# Patient Record
Sex: MALE | Race: WHITE | Employment: FULL TIME | URBAN - NONMETROPOLITAN AREA
[De-identification: names, ages, dates, MRNs, and addresses within clinical notes are randomized per-mention and may not be internally consistent; named-entity substitution may affect disease eponyms.]

---

## 2019-09-09 ENCOUNTER — TELEPHONE (OUTPATIENT)
Dept: FAMILY MEDICINE CLINIC | Age: 49
End: 2019-09-09

## 2019-11-04 ENCOUNTER — OFFICE VISIT (OUTPATIENT)
Dept: FAMILY MEDICINE CLINIC | Age: 49
End: 2019-11-04
Payer: OTHER GOVERNMENT

## 2019-11-04 ENCOUNTER — TELEPHONE (OUTPATIENT)
Dept: FAMILY MEDICINE CLINIC | Age: 49
End: 2019-11-04

## 2019-11-04 VITALS
BODY MASS INDEX: 25.33 KG/M2 | HEIGHT: 69 IN | WEIGHT: 171 LBS | SYSTOLIC BLOOD PRESSURE: 110 MMHG | HEART RATE: 86 BPM | OXYGEN SATURATION: 99 % | DIASTOLIC BLOOD PRESSURE: 68 MMHG | RESPIRATION RATE: 16 BRPM | TEMPERATURE: 97.6 F

## 2019-11-04 DIAGNOSIS — F80.81 STUTTERING: ICD-10-CM

## 2019-11-04 DIAGNOSIS — H53.9 VISUAL DISTURBANCE: ICD-10-CM

## 2019-11-04 DIAGNOSIS — F51.5 NIGHTMARES: ICD-10-CM

## 2019-11-04 DIAGNOSIS — M54.31 BILATERAL SCIATICA: ICD-10-CM

## 2019-11-04 DIAGNOSIS — G89.29 CHRONIC PAIN OF BOTH SHOULDERS: ICD-10-CM

## 2019-11-04 DIAGNOSIS — Z12.5 PROSTATE CANCER SCREENING: ICD-10-CM

## 2019-11-04 DIAGNOSIS — M25.562 CHRONIC PAIN OF BOTH KNEES: ICD-10-CM

## 2019-11-04 DIAGNOSIS — Z23 NEED FOR INFLUENZA VACCINATION: Primary | ICD-10-CM

## 2019-11-04 DIAGNOSIS — Z00.00 WELL ADULT EXAM: ICD-10-CM

## 2019-11-04 DIAGNOSIS — Z12.11 ENCOUNTER FOR SCREENING COLONOSCOPY: ICD-10-CM

## 2019-11-04 DIAGNOSIS — G47.33 OSA (OBSTRUCTIVE SLEEP APNEA): ICD-10-CM

## 2019-11-04 DIAGNOSIS — M54.32 BILATERAL SCIATICA: ICD-10-CM

## 2019-11-04 DIAGNOSIS — M25.512 CHRONIC PAIN OF BOTH SHOULDERS: ICD-10-CM

## 2019-11-04 DIAGNOSIS — M25.511 CHRONIC PAIN OF BOTH SHOULDERS: ICD-10-CM

## 2019-11-04 DIAGNOSIS — E78.2 MIXED HYPERLIPIDEMIA: ICD-10-CM

## 2019-11-04 DIAGNOSIS — F41.9 ANXIETY: ICD-10-CM

## 2019-11-04 DIAGNOSIS — F43.10 PTSD (POST-TRAUMATIC STRESS DISORDER): ICD-10-CM

## 2019-11-04 DIAGNOSIS — G89.29 CHRONIC PAIN OF BOTH KNEES: ICD-10-CM

## 2019-11-04 DIAGNOSIS — Z12.83 SKIN CANCER SCREENING: ICD-10-CM

## 2019-11-04 DIAGNOSIS — M25.561 CHRONIC PAIN OF BOTH KNEES: ICD-10-CM

## 2019-11-04 PROBLEM — E78.5 HYPERLIPIDEMIA: Status: ACTIVE | Noted: 2019-11-04

## 2019-11-04 PROBLEM — M54.30 SCIATICA: Status: ACTIVE | Noted: 2019-11-04

## 2019-11-04 PROCEDURE — 99203 OFFICE O/P NEW LOW 30 MIN: CPT | Performed by: NURSE PRACTITIONER

## 2019-11-04 PROCEDURE — 99386 PREV VISIT NEW AGE 40-64: CPT | Performed by: NURSE PRACTITIONER

## 2019-11-04 PROCEDURE — 90686 IIV4 VACC NO PRSV 0.5 ML IM: CPT | Performed by: NURSE PRACTITIONER

## 2019-11-04 PROCEDURE — 90471 IMMUNIZATION ADMIN: CPT | Performed by: NURSE PRACTITIONER

## 2019-11-04 RX ORDER — FLUTICASONE PROPIONATE 50 MCG
2 SPRAY, SUSPENSION (ML) NASAL DAILY
Qty: 1 BOTTLE | Refills: 5 | Status: SHIPPED | OUTPATIENT
Start: 2019-11-04 | End: 2020-04-27

## 2019-11-04 ASSESSMENT — ENCOUNTER SYMPTOMS
CHEST TIGHTNESS: 0
NAUSEA: 0
BLOOD IN STOOL: 0
WHEEZING: 0
SORE THROAT: 0
ABDOMINAL PAIN: 0
SHORTNESS OF BREATH: 0
COUGH: 0
BACK PAIN: 1
DIARRHEA: 0
VOMITING: 0

## 2019-11-04 ASSESSMENT — PATIENT HEALTH QUESTIONNAIRE - PHQ9
SUM OF ALL RESPONSES TO PHQ QUESTIONS 1-9: 0
SUM OF ALL RESPONSES TO PHQ9 QUESTIONS 1 & 2: 0
1. LITTLE INTEREST OR PLEASURE IN DOING THINGS: 0
SUM OF ALL RESPONSES TO PHQ QUESTIONS 1-9: 0
2. FEELING DOWN, DEPRESSED OR HOPELESS: 0

## 2019-11-05 ENCOUNTER — HOSPITAL ENCOUNTER (OUTPATIENT)
Dept: GENERAL RADIOLOGY | Age: 49
Discharge: HOME | End: 2019-11-05
Payer: OTHER GOVERNMENT

## 2019-11-05 ENCOUNTER — TELEPHONE (OUTPATIENT)
Dept: FAMILY MEDICINE CLINIC | Age: 49
End: 2019-11-05

## 2019-11-05 DIAGNOSIS — D72.819 LEUKOPENIA, UNSPECIFIED TYPE: Primary | ICD-10-CM

## 2019-11-05 DIAGNOSIS — M25.562 CHRONIC PAIN OF BOTH KNEES: Primary | ICD-10-CM

## 2019-11-05 DIAGNOSIS — G89.29 CHRONIC PAIN OF BOTH KNEES: Primary | ICD-10-CM

## 2019-11-05 DIAGNOSIS — Z12.5 PROSTATE CANCER SCREENING: ICD-10-CM

## 2019-11-05 DIAGNOSIS — M25.562 CHRONIC PAIN OF BOTH KNEES: ICD-10-CM

## 2019-11-05 DIAGNOSIS — M25.561 CHRONIC PAIN OF BOTH KNEES: Primary | ICD-10-CM

## 2019-11-05 DIAGNOSIS — Z00.00 WELL ADULT EXAM: ICD-10-CM

## 2019-11-05 DIAGNOSIS — M25.512 CHRONIC PAIN OF BOTH SHOULDERS: ICD-10-CM

## 2019-11-05 DIAGNOSIS — M25.561 CHRONIC PAIN OF BOTH KNEES: ICD-10-CM

## 2019-11-05 DIAGNOSIS — M25.511 CHRONIC PAIN OF BOTH SHOULDERS: ICD-10-CM

## 2019-11-05 DIAGNOSIS — E78.2 MIXED HYPERLIPIDEMIA: ICD-10-CM

## 2019-11-05 DIAGNOSIS — G89.29 CHRONIC PAIN OF BOTH SHOULDERS: ICD-10-CM

## 2019-11-05 DIAGNOSIS — G89.29 CHRONIC PAIN OF BOTH KNEES: ICD-10-CM

## 2019-11-05 LAB
ALBUMIN SERPL-MCNC: 4.6 G/DL (ref 3.5–5.2)
ALP BLD-CCNC: 56 U/L (ref 40–130)
ALT SERPL-CCNC: 14 U/L (ref 5–41)
ANION GAP SERPL CALCULATED.3IONS-SCNC: 14 MMOL/L (ref 7–19)
AST SERPL-CCNC: 14 U/L (ref 5–40)
BASOPHILS ABSOLUTE: 0 K/UL (ref 0–0.2)
BASOPHILS RELATIVE PERCENT: 0.5 % (ref 0–1)
BILIRUB SERPL-MCNC: 0.4 MG/DL (ref 0.2–1.2)
BILIRUBIN URINE: NEGATIVE
BLOOD, URINE: NEGATIVE
BUN BLDV-MCNC: 14 MG/DL (ref 6–20)
CALCIUM SERPL-MCNC: 9.6 MG/DL (ref 8.6–10)
CHLORIDE BLD-SCNC: 103 MMOL/L (ref 98–111)
CHOLESTEROL, TOTAL: 208 MG/DL (ref 160–199)
CLARITY: CLEAR
CO2: 24 MMOL/L (ref 22–29)
COLOR: YELLOW
CREAT SERPL-MCNC: 0.9 MG/DL (ref 0.5–1.2)
EOSINOPHILS ABSOLUTE: 0.1 K/UL (ref 0–0.6)
EOSINOPHILS RELATIVE PERCENT: 2.3 % (ref 0–5)
GFR NON-AFRICAN AMERICAN: >60
GLUCOSE BLD-MCNC: 94 MG/DL (ref 74–109)
GLUCOSE URINE: NEGATIVE MG/DL
HCT VFR BLD CALC: 47.6 % (ref 42–52)
HDLC SERPL-MCNC: 47 MG/DL (ref 55–121)
HEMOGLOBIN: 15.5 G/DL (ref 14–18)
IMMATURE GRANULOCYTES #: 0 K/UL
KETONES, URINE: NEGATIVE MG/DL
LDL CHOLESTEROL CALCULATED: 143 MG/DL
LEUKOCYTE ESTERASE, URINE: NEGATIVE
LYMPHOCYTES ABSOLUTE: 1.2 K/UL (ref 1.1–4.5)
LYMPHOCYTES RELATIVE PERCENT: 30.8 % (ref 20–40)
MCH RBC QN AUTO: 29.7 PG (ref 27–31)
MCHC RBC AUTO-ENTMCNC: 32.6 G/DL (ref 33–37)
MCV RBC AUTO: 91.2 FL (ref 80–94)
MONOCYTES ABSOLUTE: 0.6 K/UL (ref 0–0.9)
MONOCYTES RELATIVE PERCENT: 15.1 % (ref 0–10)
NEUTROPHILS ABSOLUTE: 2 K/UL (ref 1.5–7.5)
NEUTROPHILS RELATIVE PERCENT: 51 % (ref 50–65)
NITRITE, URINE: NEGATIVE
PDW BLD-RTO: 12.6 % (ref 11.5–14.5)
PH UA: 7.5 (ref 5–8)
PLATELET # BLD: 230 K/UL (ref 130–400)
PMV BLD AUTO: 10.4 FL (ref 9.4–12.4)
POTASSIUM SERPL-SCNC: 4.8 MMOL/L (ref 3.5–5)
PROSTATE SPECIFIC ANTIGEN: 0.44 NG/ML (ref 0–4)
PROTEIN UA: NEGATIVE MG/DL
RBC # BLD: 5.22 M/UL (ref 4.7–6.1)
SODIUM BLD-SCNC: 141 MMOL/L (ref 136–145)
SPECIFIC GRAVITY UA: 1.01 (ref 1–1.03)
T4 FREE: 1.2 NG/DL (ref 0.9–1.7)
TOTAL PROTEIN: 7.3 G/DL (ref 6.6–8.7)
TRIGL SERPL-MCNC: 89 MG/DL (ref 0–149)
TSH SERPL DL<=0.05 MIU/L-ACNC: 3.54 UIU/ML (ref 0.27–4.2)
UROBILINOGEN, URINE: 0.2 E.U./DL
WBC # BLD: 3.9 K/UL (ref 4.8–10.8)

## 2019-11-05 PROCEDURE — 73030 X-RAY EXAM OF SHOULDER: CPT

## 2019-11-05 PROCEDURE — 73562 X-RAY EXAM OF KNEE 3: CPT

## 2019-11-20 ENCOUNTER — TELEPHONE (OUTPATIENT)
Dept: GASTROENTEROLOGY | Age: 49
End: 2019-11-20

## 2019-11-21 ENCOUNTER — TELEPHONE (OUTPATIENT)
Dept: FAMILY MEDICINE CLINIC | Age: 49
End: 2019-11-21

## 2019-11-21 DIAGNOSIS — M25.511 CHRONIC PAIN OF BOTH SHOULDERS: ICD-10-CM

## 2019-11-21 DIAGNOSIS — M25.561 CHRONIC PAIN OF BOTH KNEES: Primary | ICD-10-CM

## 2019-11-21 DIAGNOSIS — G89.29 CHRONIC PAIN OF BOTH KNEES: Primary | ICD-10-CM

## 2019-11-21 DIAGNOSIS — M25.562 CHRONIC PAIN OF BOTH KNEES: Primary | ICD-10-CM

## 2019-11-21 DIAGNOSIS — G89.29 CHRONIC PAIN OF BOTH SHOULDERS: ICD-10-CM

## 2019-11-21 DIAGNOSIS — M25.512 CHRONIC PAIN OF BOTH SHOULDERS: ICD-10-CM

## 2019-11-27 ENCOUNTER — TELEPHONE (OUTPATIENT)
Dept: FAMILY MEDICINE CLINIC | Age: 49
End: 2019-11-27

## 2019-12-09 ENCOUNTER — OFFICE VISIT (OUTPATIENT)
Dept: PSYCHOLOGY | Age: 49
End: 2019-12-09
Payer: OTHER GOVERNMENT

## 2019-12-09 DIAGNOSIS — F33.1 MODERATE EPISODE OF RECURRENT MAJOR DEPRESSIVE DISORDER (HCC): ICD-10-CM

## 2019-12-09 DIAGNOSIS — F41.1 GAD (GENERALIZED ANXIETY DISORDER): Primary | ICD-10-CM

## 2019-12-09 PROCEDURE — 90791 PSYCH DIAGNOSTIC EVALUATION: CPT | Performed by: SOCIAL WORKER

## 2019-12-09 ASSESSMENT — PATIENT HEALTH QUESTIONNAIRE - PHQ9
3. TROUBLE FALLING OR STAYING ASLEEP: 1
6. FEELING BAD ABOUT YOURSELF - OR THAT YOU ARE A FAILURE OR HAVE LET YOURSELF OR YOUR FAMILY DOWN: 1
9. THOUGHTS THAT YOU WOULD BE BETTER OFF DEAD, OR OF HURTING YOURSELF: 0
4. FEELING TIRED OR HAVING LITTLE ENERGY: 1
2. FEELING DOWN, DEPRESSED OR HOPELESS: 1
10. IF YOU CHECKED OFF ANY PROBLEMS, HOW DIFFICULT HAVE THESE PROBLEMS MADE IT FOR YOU TO DO YOUR WORK, TAKE CARE OF THINGS AT HOME, OR GET ALONG WITH OTHER PEOPLE: 0
5. POOR APPETITE OR OVEREATING: 0
SUM OF ALL RESPONSES TO PHQ QUESTIONS 1-9: 9
7. TROUBLE CONCENTRATING ON THINGS, SUCH AS READING THE NEWSPAPER OR WATCHING TELEVISION: 2
SUM OF ALL RESPONSES TO PHQ9 QUESTIONS 1 & 2: 2
8. MOVING OR SPEAKING SO SLOWLY THAT OTHER PEOPLE COULD HAVE NOTICED. OR THE OPPOSITE, BEING SO FIGETY OR RESTLESS THAT YOU HAVE BEEN MOVING AROUND A LOT MORE THAN USUAL: 2
SUM OF ALL RESPONSES TO PHQ QUESTIONS 1-9: 9
1. LITTLE INTEREST OR PLEASURE IN DOING THINGS: 1

## 2019-12-09 ASSESSMENT — ANXIETY QUESTIONNAIRES
7. FEELING AFRAID AS IF SOMETHING AWFUL MIGHT HAPPEN: 1-SEVERAL DAYS
GAD7 TOTAL SCORE: 16
5. BEING SO RESTLESS THAT IT IS HARD TO SIT STILL: 3-NEARLY EVERY DAY
1. FEELING NERVOUS, ANXIOUS, OR ON EDGE: 3-NEARLY EVERY DAY
2. NOT BEING ABLE TO STOP OR CONTROL WORRYING: 3-NEARLY EVERY DAY
6. BECOMING EASILY ANNOYED OR IRRITABLE: 1-SEVERAL DAYS
4. TROUBLE RELAXING: 2-OVER HALF THE DAYS
3. WORRYING TOO MUCH ABOUT DIFFERENT THINGS: 3-NEARLY EVERY DAY

## 2019-12-10 ENCOUNTER — APPOINTMENT (OUTPATIENT)
Dept: OPERATING ROOM | Age: 49
End: 2019-12-10

## 2019-12-10 ENCOUNTER — TELEPHONE (OUTPATIENT)
Dept: FAMILY MEDICINE CLINIC | Age: 49
End: 2019-12-10

## 2019-12-10 ENCOUNTER — HOSPITAL ENCOUNTER (OUTPATIENT)
Age: 49
Setting detail: OUTPATIENT SURGERY
Discharge: HOME | End: 2019-12-10
Attending: INTERNAL MEDICINE | Admitting: INTERNAL MEDICINE
Payer: OTHER GOVERNMENT

## 2019-12-10 ENCOUNTER — ANESTHESIA EVENT (OUTPATIENT)
Dept: OPERATING ROOM | Age: 49
End: 2019-12-10

## 2019-12-10 ENCOUNTER — ANESTHESIA (OUTPATIENT)
Dept: OPERATING ROOM | Age: 49
End: 2019-12-10

## 2019-12-10 VITALS — OXYGEN SATURATION: 98 % | TEMPERATURE: 97 F | DIASTOLIC BLOOD PRESSURE: 69 MMHG | SYSTOLIC BLOOD PRESSURE: 121 MMHG

## 2019-12-10 VITALS
BODY MASS INDEX: 23.76 KG/M2 | HEART RATE: 66 BPM | WEIGHT: 160.4 LBS | RESPIRATION RATE: 16 BRPM | TEMPERATURE: 97.5 F | DIASTOLIC BLOOD PRESSURE: 80 MMHG | HEIGHT: 69 IN | OXYGEN SATURATION: 100 % | SYSTOLIC BLOOD PRESSURE: 126 MMHG

## 2019-12-10 PROCEDURE — G0121 COLON CA SCRN NOT HI RSK IND: HCPCS

## 2019-12-10 PROCEDURE — 45378 DIAGNOSTIC COLONOSCOPY: CPT | Performed by: INTERNAL MEDICINE

## 2019-12-10 PROCEDURE — G8907 PT DOC NO EVENTS ON DISCHARG: HCPCS

## 2019-12-10 PROCEDURE — G8918 PT W/O PREOP ORDER IV AB PRO: HCPCS

## 2019-12-10 RX ORDER — SODIUM CHLORIDE 9 MG/ML
INJECTION, SOLUTION INTRAVENOUS CONTINUOUS
Status: DISCONTINUED | OUTPATIENT
Start: 2019-12-10 | End: 2019-12-10 | Stop reason: HOSPADM

## 2019-12-10 RX ORDER — LIDOCAINE HYDROCHLORIDE 10 MG/ML
INJECTION, SOLUTION EPIDURAL; INFILTRATION; INTRACAUDAL; PERINEURAL PRN
Status: DISCONTINUED | OUTPATIENT
Start: 2019-12-10 | End: 2019-12-10 | Stop reason: SDUPTHER

## 2019-12-10 RX ORDER — PROPOFOL 10 MG/ML
INJECTION, EMULSION INTRAVENOUS PRN
Status: DISCONTINUED | OUTPATIENT
Start: 2019-12-10 | End: 2019-12-10 | Stop reason: SDUPTHER

## 2019-12-10 RX ADMIN — PROPOFOL 50 MG: 10 INJECTION, EMULSION INTRAVENOUS at 11:44

## 2019-12-10 RX ADMIN — PROPOFOL 100 MG: 10 INJECTION, EMULSION INTRAVENOUS at 11:37

## 2019-12-10 RX ADMIN — SODIUM CHLORIDE: 9 INJECTION, SOLUTION INTRAVENOUS at 10:41

## 2019-12-10 RX ADMIN — PROPOFOL 50 MG: 10 INJECTION, EMULSION INTRAVENOUS at 11:36

## 2019-12-10 RX ADMIN — LIDOCAINE HYDROCHLORIDE 50 MG: 10 INJECTION, SOLUTION EPIDURAL; INFILTRATION; INTRACAUDAL; PERINEURAL at 11:36

## 2019-12-10 RX ADMIN — PROPOFOL 50 MG: 10 INJECTION, EMULSION INTRAVENOUS at 11:41

## 2019-12-10 ASSESSMENT — PAIN SCALES - GENERAL
PAINLEVEL_OUTOF10: 0
PAINLEVEL_OUTOF10: 0

## 2020-01-13 ENCOUNTER — OFFICE VISIT (OUTPATIENT)
Dept: PSYCHOLOGY | Age: 50
End: 2020-01-13
Payer: OTHER GOVERNMENT

## 2020-01-13 PROCEDURE — 90834 PSYTX W PT 45 MINUTES: CPT | Performed by: SOCIAL WORKER

## 2020-01-13 NOTE — PROGRESS NOTES
Behavioral Health Consultation  Gaudencio White, [address] Consultant  1/13/2020  8:35 AM      Time spent with Patient:  45 minutes  This is patient's second  [facility] appointment. Reason for Consult:    Chief Complaint   Patient presents with    Depression    Anxiety    Stress     Referring Provider: No referring provider defined for this encounter. Feedback given to PCP. S:  Patient reports problems with feeling anxious, tense. Been hanging in there. Work can be stressful, more the people than the job, it is not a big deal, but I do get frustrated. Addressed current and underlying issues, explored and released associated emotions, explored new ways to deal and cope with these problems. I am taking more time to relax. I have not seen the one image as often. I am communicating more about frustrating things. I am talking to my wife more about the bad dreams. I am paying up more attention to them, I did not realize how often I wake up at night. Discloses about case in [address] [place] [address], there are so many, they are horrible, the same visions. Every night. O:  MSE:    Mood    Anxious  Depressed  Low self-esteem  [place]  Affect    anxiety  Appetite Fair  Sleep disturbance Yes  Fatigue Yes  Loss of pleasure Yes  Attention/Concentration    intact  Morbid ideation No  Suicide Assessment    no suicidal ideation        A:  Patient presents for consult due to problems with PTSD, depression, anxiety, chronic stressors stremming from exposure to stressful and traumatic events, profession and work related. PHQ 9 score: 15    Continued consultation is clinically/medically necessary to support in learning new skills and build confidence to deal better with these issues. Patient response to consults, finds new strategies helpful. Diagnosis:    1. PTSD (post-traumatic stress disorder)    2. YING (generalized anxiety disorder)    3.  Moderate episode of recurrent major depressive disorder

## 2020-01-13 NOTE — PATIENT INSTRUCTIONS
to be careful before going to work will prevent her from getting in a wreck. I do it every morning and she hasnt gotten in a wreck yet.   Emotional reasoning. The belief that because you feel a certain way means that the assumptions and associations you have with that feeling are true. The fear, doom, and constant anxiety must mean something is seriously wrong with me. 

## 2020-02-03 ENCOUNTER — OFFICE VISIT (OUTPATIENT)
Dept: PSYCHOLOGY | Age: 50
End: 2020-02-03
Payer: OTHER GOVERNMENT

## 2020-02-03 PROCEDURE — 90834 PSYTX W PT 45 MINUTES: CPT | Performed by: SOCIAL WORKER

## 2020-02-03 NOTE — PROGRESS NOTES
Behavioral Health Consultation  Ousmane Casper, [address] Consultant  2/3/2020  8:35 AM      Time spent with Patient:  45 minutes  This is patient's third  [facility] appointment. Reason for Consult:    Chief Complaint   Patient presents with    Anxiety    Depression    Stress     Referring Provider: No referring provider defined for this encounter. Feedback given to PCP. S:  Patient reports problems with feeling upset, I cannot get these pictures out of my head and feel so guilty for the ones I could not save. Intense emotions. Mind racing, pressure. It is exhausting, makes me frustrated. Addressed current and underlying issues, disclosed and described traumatic events, loss of mother, doubt, explored and released associated emotions, explored new ways to deal and cope with these problems. O:  MSE:     Mood    Anxious, guilt  Depressed  Low self-esteem  [place]  Affect    anxiety  Appetite Fair  Sleep disturbance Yes  Fatigue Yes  Loss of pleasure Yes  Attention/Concentration    intact  Morbid ideation No  Suicide Assessment    no suicidal ideation           A:  Patient presents for consult due to problems with PTSD, depression, anxiety, chronic stressors stremming from exposure to stressful and traumatic events, profession and work related.     PHQ 9 score: 13     Continued consultation is clinically/medically necessary to support in learning new skills and build confidence to deal better with these issues. Patient response to consults, finds new strategies helpful.      Diagnosis:     1. PTSD (post-traumatic stress disorder)    2. YING (generalized anxiety disorder)    3.  Moderate episode of recurrent major depressive disorder (HCC)                Diagnosis Date    Basal cell carcinoma       scalp, chest    Chronic lower back pain      CPAP (continuous positive airway pressure) dependence      Hyperlipidemia      RILEY (obstructive sleep apnea)      Sciatica            Plan:  Pt interventions:  Trained in strategies for increasing balanced thinking, Discussed self-care (sleep, nutrition, rewarding activities, social support, exercise), Discussed use of imagery, distractions, relaxation, mood management, communication training, questioning unhelpful thinking, problem-solving, and behavioral activation to manage pain and Supportive techniques, Affirmation and Normalization.  Education about effects of these kind of stressors.        Pt Behavioral Change Plan:  See Pt Instructions

## 2020-02-03 NOTE — PATIENT INSTRUCTIONS
RAMOS LEWIS 1/13/2020 8:31 AM Addendum  Recommendations to patient:                 1. Practice new coping, stress management, relaxation skills at least                   two times a day for at least 10-30 minutes.              2. Find at least one positive outlet per day that makes you feel better.              3. Talk things over with a good friend. Practice letting things go.              4. Stop, breathe, reset. \"I am ok. \"              0. I can find closure.               6. I can give my mind direction. 7. Stay in the moment.      Scheduled follow up appointment.     Call for a sooner appointment if needed or if you need to change or cancel you appointment.     Allison [phone]      How To Question Stressful, Angry, Anxious, or Depressed Thinking    1. Am I upsetting myself unnecessarily? How can I see this another way? 2. Is my thinking working for or against me? How could I view this in a less upsetting way? 3. What am I demanding must happen? What do I want or prefer, rather than need? 4. Am I making something too terrible? Is that awful? What would be so terrible about that? 5. Am I labeling a person? What is the action that I dont like? 6. What is untrue about my thoughts? How can I stick to the facts? 7. Am I using extreme, black-and-white language? What words might be more accurate? 8. Am I fortune-telling or mind-reading in a way that gets me upset or unhappy? What are the odds  or chances that it will really turn out the way Im thinking or imagining? 9. What are my options in this situation? How would I like to respond? 10. What are more moderate, helpful, or realistic statements to replace the upsetting ones? 11. Have I had any experiences that show that this thought might not be completely true? 12. If my best friend or someone I loved had this thought, what would I tell them? 15. If someone I cared about knew I was thinking this thought, what would they say to me? 14.  Are there strengths in me or positives in the situation that I am ignoring? 15. When I am not feeling this way, do I think about this situation any differently? How?  16. Have I been in this type of situation before? What happened? What have I learned from prior experiences that could help me now? 17. Five years from now, if I look back on this situation, will I look at it any differently? Will I focus on any different part of my experience? 25. Am I blaming myself for something over which I do not have complete control? 19. Thinking Mistakes That Create Stress, Anger, Depression, Anxiety, and Worry    All-or-nothing thinking. You see things in black-and-white categories. It is either one thing or another; there is no room for anything in between. Im 100% healthy or I must have a fatal disease.   Jumping to conclusions. You make a negative interpretation even though there are no definite facts that convincingly support your conclusion. My  is late because he is in a car accident and is injured on the side of the road.   Fortune-telling. You anticipate things will turn out badly, convinced the prediction is a fact. Not getting this job will cause us to lose the house.    Should statements. Musts and oughts are also offenders. Emotional consequences can include anxiety and anger. I should be able to handle this.    Overgeneralization. Assuming one event is actually a pattern. My hand is a little shaky today, I must have Parkinsons Disease.   Disqualifying the positive. Filtering out or rejecting positive experiences to maintain negative beliefs. Upon hearing that your spouse has checked all the doors and windows and they are all locked you think, But someone could cut out a piece of glass and open the window.   Catastrophizing. Predicting the worst possible outcome imaginable. Terrible, awful, horrible, worst ever might be key words.  If I cant get my heart to stop pounding Im going to die.     Superstitious thinking. The thought that something you do prevents something awful from happening. Ligia Mar my spouse a hug and telling her to be careful before going to work will prevent her from getting in a wreck. I do it every morning and she hasnt gotten in a wreck yet.   Emotional reasoning. The belief that because you feel a certain way means that the assumptions and associations you have with that feeling are true. The fear, doom, and constant anxiety must mean something is seriously wrong with me. 

## 2020-03-02 ENCOUNTER — OFFICE VISIT (OUTPATIENT)
Dept: PSYCHOLOGY | Age: 50
End: 2020-03-02
Payer: OTHER GOVERNMENT

## 2020-03-02 PROCEDURE — 90834 PSYTX W PT 45 MINUTES: CPT | Performed by: SOCIAL WORKER

## 2020-03-02 NOTE — PATIENT INSTRUCTIONS
Recommendations to patient:                 1. Practice new coping, stress management, relaxation skills at least                   two times a day for at least 10-30 minutes.              2. Find at least one positive outlet per day that makes you feel better.              3. Talk things over with a good friend. Practice letting things go.              4. Stop, breathe, reset. \"I am ok. \"              3. I can find closure.               8. I can give my mind direction. 7. Stay in the moment.     8. I can feel, when my body and my mind feels calmer.      Scheduled follow up appointment.     Call for a sooner appointment if needed or if you need to change or cancel you appointment.     Allison [phone]       When to say Yes  How to say No  To take control of your life      Houston Mendoza

## 2020-03-02 NOTE — PROGRESS NOTES
Behavioral Health Consultation  Meghana Curry, [address] [facility] Consultant  3/2/2020  8:35 AM        Time spent with Patient: 45 minutes  This is patient's fourth  [facility] appointment. Reason for Consult:    Chief Complaint   Patient presents with    Anxiety    Depression    Stress     Referring Provider: No referring provider defined for this encounter. Feedback given to PCP. S:  Patient reports problems with feeling tense, anxious, upset, the pictures are haunting me. I have a hard time doing nothing. My mind gets to racing. Trying to keep that contained. Irritable, things are getting to me. My sense of urgency from the calls is still there. The sense of being held up. Addressed current and underlying issues, explored and released associated emotions, explored new ways to deal and cope with these problems. Disclosed about incident. I feel better getting it out. Keeping busy with projects, watching basketball. Trimming in when painting was good. I am telling myself to go at my own pace. I have done a lot of breathing and trying to relax. I am trying to slow down. I have times when I feel the calm, briefly. With certain words, feeling different. I am talking to colleagues who have been through things that I have, it is, for a short time, like a weight being lifted.        O:  MSE:     Mood    Anxious, guilt  Depressed  Low self-esteem, I feel like I failed them  [place]  Affect    anxiety  Appetite Fair  Sleep disturbance Yes, wake 8-10 times  Fatigue Yes  Loss of pleasure Yes  Attention/Concentration    intact  Morbid ideation No  Suicide Assessment    no suicidal ideation           A:  Patient presents for consult due to problems with PTSD, depression, anxiety, chronic stressors stremming from exposure to stressful and traumatic events, profession and work related.     PHQ 9 score: 19     Continued consultation is clinically/medically necessary to support in learning new skills and build confidence to deal better with these issues. Patient response to consults, finds new strategies helpful.      Diagnosis:     1. PTSD (post-traumatic stress disorder)    2. YING (generalized anxiety disorder)    3. Moderate episode of recurrent major depressive disorder (HCC)                   Diagnosis Date    Basal cell carcinoma       scalp, chest    Chronic lower back pain      CPAP (continuous positive airway pressure) dependence      Hyperlipidemia      RILEY (obstructive sleep apnea)      Sciatica              Plan:  Pt interventions:  Trained in strategies for increasing balanced thinking, Discussed self-care (sleep, nutrition, rewarding activities, social support, exercise), Discussed use of imagery, distractions, relaxation, mood management, communication training, questioning unhelpful thinking, problem-solving, and behavioral activation to manage pain and Supportive techniques, Affirmation and Normalization. Education about effects of these kind of stressors.  CBT and supportive techniques.         Pt Behavioral Change Plan:  See Pt Instructions

## 2020-04-27 ENCOUNTER — PATIENT MESSAGE (OUTPATIENT)
Dept: FAMILY MEDICINE CLINIC | Age: 50
End: 2020-04-27

## 2020-04-27 RX ORDER — FLUTICASONE PROPIONATE 50 MCG
SPRAY, SUSPENSION (ML) NASAL
Qty: 16 G | Refills: 0 | Status: SHIPPED | OUTPATIENT
Start: 2020-04-27 | End: 2020-06-01

## 2020-04-27 NOTE — TELEPHONE ENCOUNTER
From: Krys River  To: LUKE Painter  Sent: 4/27/2020 3:12 PM CDT  Subject: Visit Follow-Up Question    Good afternoon, I have a follow-up appoint with LUKE Robison on 5/4 at [address] [place] [address]. Is this still going to be an in-office visit? Also looks like I have Blood Work expected to be completed by 5/3, with the current state of things, is this also needing to be completed prior to this visit and if so what is the current process for this?      Respectfully,  Rosemarie Wright

## 2020-04-30 ENCOUNTER — VIRTUAL VISIT (OUTPATIENT)
Dept: PSYCHOLOGY | Age: 50
End: 2020-04-30
Payer: OTHER GOVERNMENT

## 2020-04-30 PROCEDURE — 90834 PSYTX W PT 45 MINUTES: CPT | Performed by: SOCIAL WORKER

## 2020-05-04 ENCOUNTER — TELEMEDICINE (OUTPATIENT)
Dept: FAMILY MEDICINE CLINIC | Age: 50
End: 2020-05-04
Payer: OTHER GOVERNMENT

## 2020-05-04 ENCOUNTER — TELEPHONE (OUTPATIENT)
Dept: FAMILY MEDICINE CLINIC | Age: 50
End: 2020-05-04

## 2020-05-04 VITALS — HEIGHT: 69 IN | WEIGHT: 167 LBS | BODY MASS INDEX: 24.73 KG/M2

## 2020-05-04 PROBLEM — F43.10 PTSD (POST-TRAUMATIC STRESS DISORDER): Status: ACTIVE | Noted: 2020-05-04

## 2020-05-04 PROBLEM — H83.3X3 NOISE-INDUCED HEARING LOSS OF BOTH EARS: Status: ACTIVE | Noted: 2020-05-04

## 2020-05-04 PROBLEM — H93.13 TINNITUS OF BOTH EARS: Status: ACTIVE | Noted: 2020-05-04

## 2020-05-04 PROBLEM — F41.1 GAD (GENERALIZED ANXIETY DISORDER): Status: ACTIVE | Noted: 2020-05-04

## 2020-05-04 PROBLEM — E78.2 MIXED HYPERLIPIDEMIA: Status: ACTIVE | Noted: 2019-11-04

## 2020-05-04 PROCEDURE — 99214 OFFICE O/P EST MOD 30 MIN: CPT | Performed by: NURSE PRACTITIONER

## 2020-05-04 ASSESSMENT — ENCOUNTER SYMPTOMS
DIARRHEA: 0
SORE THROAT: 0
CHEST TIGHTNESS: 0
SHORTNESS OF BREATH: 0
WHEEZING: 0
VOMITING: 0
NAUSEA: 0
COUGH: 0
ABDOMINAL PAIN: 0

## 2020-05-04 NOTE — PROGRESS NOTES
2020    TELEHEALTH EVALUATION -- Audio/Visual (During LYGJU-77 public health emergency)    HPI:    Jacob Gustafson (:  1970) has requested an audio/video evaluation for the following concern(s):    Routine follow-up, virtual visit. Anxiety has been well controlled. Has chronic history as well as PTSD. Managed with counseling, followed by [facility]. He is doing well at this time. He has chronic arthralgias involving multiple joints. He saw PT previously for shoulder pain which is doing well at this time, PT really helped. He will see Ortho in [place] in a couple of weeks for chronic knee pain, initial visit. He has had pain in both elbows, chronic for at least a year or more, progressively getting worse. Localized to posterior and medial aspect of both elbows. Worse after exercising, particularly over the past week. No new exercises, has been doing the same regimen for some time and tolerating. He does do pull-ups and this has been more difficult with the elbow pain. RILEY is stable, using CPAP nightly. Diagnosed in  prior to moving here. He is still needing supplies for CPAP, has not heard anything since our last visit. In particular needs a chinstrap, hose for machine, supplies. Machine itself is working well. Hyperlipidemia  No medications. Attempting to reduce processed sugar and cholesterol from diet. Exercising 3 to 5 days/week  LDL in November 143, total cholesterol 208    He needs a hearing exam, gets these yearly typically. 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 Also has tinnitus. Review of Systems   Constitutional: Negative for chills and fever. HENT: Positive for hearing loss (chronic, needs hearing exam). Negative for congestion, ear pain and sore throat. Respiratory: Negative for cough, chest tightness, shortness of breath and wheezing.

## 2020-05-28 ENCOUNTER — OFFICE VISIT (OUTPATIENT)
Dept: PSYCHOLOGY | Age: 50
End: 2020-05-28
Payer: OTHER GOVERNMENT

## 2020-05-28 PROCEDURE — 90834 PSYTX W PT 45 MINUTES: CPT | Performed by: SOCIAL WORKER

## 2020-05-28 NOTE — PATIENT INSTRUCTIONS
others attain are trivial. \"That's what wives are supposed to do--so it doesn't count when she's nice to me. \" \"Those successes were easy, so they don't matter. \"  6. Negative filter: You focus almost exclusively on the negatives and seldom notice the positives. \"Look at all of the people who don't like me. \"  7. Overgeneralizing: You perceive a global pattern of negatives on the basis of a single incident. \"This generally happens to me. I seem to fail at a lot of things. \"  8. Dichotomous thinking: You view events, or people, in all-or-nothing terms. \"I get rejected by everyone\" or \"It was a waste of time. \"  9. Shoulds: You interpret events in terms of how things should be or should have gone rather than simply focusing on what is. \"I should do well. If I don't, then I'm a failure. \"  10. Personalizing: You attribute a disproportionate amount of the blame to yourself for negative events and fail to see that certain events are also caused by others. \"The marriage ended because I failed\"  6. Blaming: You focus on the other person as the source of your negative feelings and you refuse to take responsibility for changing yourself. \"She's to blame for the way I feel now\" or \"My parents caused all my problems. \"  12. Unfair comparisons: You interpret events in terms of standards that are unrealistic-for example, you focus primarily on others who do better than you and find yourself inferior in the comparison. \"She's more successful than I am\" or \"Others did better than I did on the test.\"  13. Regret orientation: You focus on the idea that you could have done better in the past, rather on what you can do better now. \"I could have had a better job if I had tried\". (best friends with the Shoulds)   15. What if?: You keep asking a series of questions about \"What if\" something happens and fail to be satisfied with any of the answers. \"Yeah, but what if I get anxious? Or what if I can't catch my breath? \"  15.  Emotional reasoning: You let your feelings guide your interpretation of reality-for example, \"I feel depressed, therefore my marriage is not working out. \"  16. Inability to disconfirm: You reject any evidence or arguments that might contradict your negative thoughts. For example, when you have the thought \"I'm unlovable\", you reject as irrelevant any evidence that people like you. Consequently, your thought cannot be refuted. \"That's not the real issue. There are deeper problems. There are other factors. \"  17. Judgment Focus: You view yourself, others and events in terms of evaluations of good, bad or superior-inferior, rather than simply describing, accepting, or understanding. You are continually measuring yourself and others according to arbitrary standards, finding that you and others fall short. You are focused on the judgments of others as well as your own judgments of yourself. \"I didn't perform well in college\" or \"If I take up tennis, I won't do well\" or \"Look how successful she is. I'm not successful\".

## 2020-05-28 NOTE — PROGRESS NOTES
2. YING (generalized anxiety disorder)    3. Moderate episode of recurrent major depressive disorder (HCC)                   Diagnosis Date    Basal cell carcinoma       scalp, chest    Chronic lower back pain      CPAP (continuous positive airway pressure) dependence      Hyperlipidemia      RILEY (obstructive sleep apnea)      Sciatica              Plan:  Pt interventions:  Trained in strategies for increasing balanced thinking, Discussed self-care (sleep, nutrition, rewarding activities, social support, exercise), Discussed use of imagery, distractions, relaxation, mood management, communication training, questioning unhelpful thinking, problem-solving, and behavioral activation to manage pain and Supportive techniques, Affirmation and Normalization. Education about effects of these kind of stressors.  CBT and supportive techniques.         Pt Behavioral Change Plan:  See Pt Instructions

## 2020-06-01 RX ORDER — FLUTICASONE PROPIONATE 50 MCG
SPRAY, SUSPENSION (ML) NASAL
Qty: 16 G | Refills: 0 | Status: SHIPPED | OUTPATIENT
Start: 2020-06-01 | End: 2020-06-30

## 2020-06-11 ENCOUNTER — PROCEDURE VISIT (OUTPATIENT)
Dept: OTOLARYNGOLOGY | Age: 50
End: 2020-06-11
Payer: OTHER GOVERNMENT

## 2020-06-11 PROCEDURE — 92550 TYMPANOMETRY & REFLEX THRESH: CPT | Performed by: AUDIOLOGIST

## 2020-06-11 PROCEDURE — 92557 COMPREHENSIVE HEARING TEST: CPT | Performed by: AUDIOLOGIST

## 2020-06-30 RX ORDER — FLUTICASONE PROPIONATE 50 MCG
SPRAY, SUSPENSION (ML) NASAL
Qty: 16 G | Refills: 0 | Status: SHIPPED | OUTPATIENT
Start: 2020-06-30 | End: 2020-08-03

## 2020-07-21 ENCOUNTER — OFFICE VISIT (OUTPATIENT)
Dept: PSYCHOLOGY | Age: 50
End: 2020-07-21
Payer: OTHER GOVERNMENT

## 2020-07-21 PROCEDURE — 90834 PSYTX W PT 45 MINUTES: CPT | Performed by: SOCIAL WORKER

## 2020-07-21 NOTE — PROGRESS NOTES
Behavioral Health Consultation  Brittany Contreras, [address] Consultant  7/21/2020  8:27 AM      Face to face in office     Time spent with Patient: 45 minutes  This is patient's sixth  [facility] appointment.     Reason for Consult:          Chief Complaint   Patient presents with    Depression    Anxiety    Stress      Referring Provider: No referring provider defined for this encounter.        Feedback given to PCP.     S:  Patient reports problems with feeling stressed. We are all cooped up and it makes me think about a lot things inside, it brings it out worse. Tired, starting to wear on me, more irritable, feel like I need to get away. Compounding. I don't want to deal with e mails, phone, don't feel like being [employer], I do what I need to, but I am more irritable and short. The things that haunt me, do more now.     Addressed current and underlying issues, trauma and current affects on functioning, the changes because of the virus and associated stress, explored and released associated emotions, explored new ways to deal and cope with these problems.   I am taking some time off, get away from everything. With everything going on, the virus, daughter is starting school and we are concerned about her going back to school. I am exploring what I need to take care of in preparation of retiring a little over a year from now.  It is scary not to thing that I am in this service.        O:  MSE:     Mood    Anxious, guilt  Depressed  Low self-esteem  [place]  Affect    anxiety, intense  Appetite Fair  Sleep disturbance Yes, nightmares  Fatigue Yes  Loss of pleasure Yes  Attention/Concentration    intact  Morbid ideation No  Suicide Assessment    no suicidal ideation           A:  Patient presents for consult due to problems with PTSD, depression, anxiety, chronic stressors stremming from exposure to stressful and traumatic events professionally and work related.        Continued consultation is clinically/medically necessary to support in learning new skills and build confidence to deal better with these issues. Patient response to consults, finds new strategies helpful.      Diagnosis:     1. PTSD (post-traumatic stress disorder)    2. YING (generalized anxiety disorder)    3. Moderate episode of recurrent major depressive disorder (HCC)                   Diagnosis Date    Basal cell carcinoma       scalp, chest    Chronic lower back pain      CPAP (continuous positive airway pressure) dependence      Hyperlipidemia      RILEY (obstructive sleep apnea)      Sciatica              Plan:  Pt interventions:  Trained in strategies for increasing balanced thinking, Discussed self-care (sleep, nutrition, rewarding activities, social support, exercise), Discussed use of imagery, distractions, relaxation, mood management, communication training, questioning unhelpful thinking, problem-solving, and behavioral activation to manage pain and Supportive techniques, Affirmation and Normalization. Education about effects of these kind of stressors.  CBT and supportive techniques.         Pt Behavioral Change Plan:  See Pt Instructions

## 2020-08-03 RX ORDER — FLUTICASONE PROPIONATE 50 MCG
SPRAY, SUSPENSION (ML) NASAL
Qty: 16 G | Refills: 0 | Status: SHIPPED | OUTPATIENT
Start: 2020-08-03 | End: 2020-09-01

## 2020-08-18 ENCOUNTER — VIRTUAL VISIT (OUTPATIENT)
Dept: PSYCHOLOGY | Age: 50
End: 2020-08-18
Payer: OTHER GOVERNMENT

## 2020-08-18 PROCEDURE — 90834 PSYTX W PT 45 MINUTES: CPT | Performed by: SOCIAL WORKER

## 2020-09-01 RX ORDER — FLUTICASONE PROPIONATE 50 MCG
SPRAY, SUSPENSION (ML) NASAL
Qty: 16 G | Refills: 0 | Status: SHIPPED | OUTPATIENT
Start: 2020-09-01 | End: 2020-10-01 | Stop reason: SDUPTHER

## 2020-09-03 NOTE — PATIENT INSTRUCTIONS
Recommendations to patient:                 1. Practice new coping, stress management, relaxation skills at least                   two times a day for at least 10-30 minutes.              2. Find at least one positive outlet per day that makes you feel better.              3. Talk things over with a good friend. Practice letting things go.              4. Stop, breathe, reset. \"I am ok. \"              1. I can find closure.               6. I can give my mind direction.              7. Stay in the moment.   [phone]. I can feel, when my body and my mind feels calmer.      Scheduled follow up appointment.     Call for a sooner appointment if needed or if you need to change or cancel you appointment.     Allison [phone]

## 2020-09-03 NOTE — PROGRESS NOTES
Behavioral Health Consultation  Tayo Bender, [address] Consultant  9/3/2020  7:02 AM        TELEHEALTH EVALUATION -- Audio/Visual (During MVOTB-46 public health emergency)    Patient being evaluated by a Virtual Visit (phone visit) encounter to address concerns as mentioned above. A caregiver was present when appropriate. Due to this being a TeleHealth encounter (During TPRFK-42 public health emergency), evaluation of the following organ systems was limited: Vitals/Constitutional/EENT/Resp/CV/GI//MS/Neuro/Skin/Heme-Lymph-Imm. Pursuant to the emergency declaration under the [facility], [address] [place] [address] authority and the [employer] Act, this Virtual Visit was conducted with patient's (and/or legal guardian's) consent, to reduce the patient's risk of exposure to COVID-19 and provide necessary medical care. The patient (and/or legal guardian) has also been advised to contact this office for worsening conditions or problems, and seek emergency medical treatment and/or call 911 if deemed necessary. Patient identification was verified at the start of the visit: Yes     Services were provided through a video synchronous discussion virtually to substitute for in-person clinic visit. Patient and provider were located at their individual homes/office. Note is for  8/18/2020      . Did not have access to Epic at the time of service. 7:27am-8:14am       Time spent with Patient: 45 minutes  This is patient's sixth  [facility] appointment.     Reason for Consult:          Chief Complaint   Patient presents with    Depression    Anxiety    Stress      Referring Provider: No referring provider defined for this encounter.        Feedback given to PCP.     S:  Patient reports problems with feeling overwhelmed when things are stirred up.  Described situation that triggered and disclosed and released about related traumatic events he had to handle as a [employer] member. Intense flashbacks occurred.      Addressed current and underlying issues, trauma and current affects on functioning, the changes because of the virus and associated stress, explored and released associated emotions, explored new ways to deal and cope with these problems.   It brought me right back there and I knew that I needed to talk to you about this. We did get away some and that was good.           O:  MSE:     Mood    Anxious, guilt  Depressed  Low self-esteem  [place]  Affect    anxiety, intense  Appetite Fair  Sleep disturbance Yes, nightmares  Fatigue Yes  Loss of pleasure Yes  Attention/Concentration    intact  Morbid ideation No  Suicide Assessment    no suicidal ideation           A:  Patient presents for consult due to problems with PTSD, depression, anxiety, chronic stressors stremming from exposure to stressful and traumatic events professionally and work related.        Continued consultation is clinically/medically necessary to support in learning new skills and build confidence to deal better with these issues. Patient response to consults, finds new strategies helpful.      Diagnosis:     1. PTSD (post-traumatic stress disorder)    2. YING (generalized anxiety disorder)    3.  Moderate episode of recurrent major depressive disorder (HCC)                   Diagnosis Date    Basal cell carcinoma       scalp, chest    Chronic lower back pain      CPAP (continuous positive airway pressure) dependence      Hyperlipidemia      RILEY (obstructive sleep apnea)      Sciatica              Plan:  Pt interventions:  Trained in strategies for increasing balanced thinking, Discussed self-care (sleep, nutrition, rewarding activities, social support, exercise), Discussed use of imagery, distractions, relaxation, mood management, communication training, questioning unhelpful thinking, problem-solving, and behavioral activation to manage pain and Supportive techniques, Affirmation and Normalization. Education about effects of these kind of stressors.  CBT and supportive techniques.         Pt Behavioral Change Plan:  See Pt Instructions

## 2020-09-22 ENCOUNTER — OFFICE VISIT (OUTPATIENT)
Dept: PSYCHOLOGY | Age: 50
End: 2020-09-22
Payer: OTHER GOVERNMENT

## 2020-09-22 PROCEDURE — 90834 PSYTX W PT 45 MINUTES: CPT | Performed by: SOCIAL WORKER

## 2020-09-22 NOTE — PATIENT INSTRUCTIONS
Recommendations to patient:               5. Practice new coping, stress management, relaxation skills at least                   two times a day for at least 10-30 minutes.              2. Find at least one positive outlet per day that makes you feel better.              3. Talk things over with a good friend. Practice letting things go.              4. Stop, breathe, reset. \"I am ok. \"              3. I can find closure.               8. I can give my mind direction.              7. Stay in the moment.   [phone]. I can feel, when my body and my mind feels calmer.    9. Put things up at night, compartmentalize. 10. I can take the foot off the accelerator.      Scheduled follow up appointment.     Call for a sooner appointment if needed or if you need to change or cancel you appointment.     Allison [phone]      The Stress Response and How It Can Affect You   The stress response, or fight or flight response is the emergency reaction system of the body. It is there to keep you safe in emergencies. The stress response includes physical and thought responses to your perception of various situations. When the stress response is turned on, your body may release substances like adrenaline and cortisol. Your organs are programmed to respond in certain ways to situations that are viewed as challenging or threatening. The stress response can work against you. You can turn it on when you dont really need it and, as a result, perceive something as an emergency when its really not. It can turn on when you are just thinking about past or future events. Harmless, chronic conditions can be intensified by the stress response activating too often, with too much intensity, or for too long. Stress responses can be different for different individuals. Below is a list of some common stress related responses people have.  ([place] the responses you have had in the last 2 weeks.)     Physical Responses   Muscle aches Insomnia   ? Heart rate   Headache   Weight gain   Nausea   Constipation   Dry mouth   Muscle twitching  Weight loss   Low energy   Weakness   Tight chest   Diarrhea   Dizziness   Trembling   Stomach cramps  Chills    Hot flashes   Sweating   Pounding heart  Choking feeling  Chest pain   Leg cramps   Numb hands/feet Dry throat   Appetite change  Face flushing   ? Blood pressure  Light-headedness  Feeling faint       Troubleswallowing   Rash ? Urination   Neck pain     Tingling hands/feet     Emotional and Thought Responses   Restlessness   Agitation   Insecurity            Worthlessness   Anxiety   Stress   Depression            Hopelessness   Guilt    Defensiveness  Anger           Racing thoughts   Nightmares   Intense thinking  Sensitivity          Expecting the worst   Numbness   Lack of motivation  Mood swings             Forgetfulness   ? Concentration  Rigidity              Preoccupation  Intolerance     Behavioral Responses   Avoidance   Withdrawal   Neglect   ? Alcohol use    Smoking   ? Eating   Arguing       Poor appearance   ? Spending   Poor hygiene   ? Eating  Seeking reassurance   Nail biting   Skin picking   ? Talking        ? Body checking   Sexual problems  Foot tapping  Fidgeting Rapid walking    ? Exercise   Teeth clenching           Multitasking  Aggressive speaking       ? Fun activities  ? Sleeping      ? Relaxing activities     Seeking information     The parasympathetic nervous system in your body is designed to turn on your bodys relaxation response. Your behaviors and thinking can keep your bodys natural relaxation response from operating at its best.   Getting your body to relax on a daily basis for at least brief periods can help decrease unpleasant stress responses. Learning to relax your body, through specific breathing and relaxation exercises as well as by minimizing stressful thinking, can help your bodys natural relaxation system be more effective.         STRESS MANAGEMENT STRATEGIES    1. Recognize Stress:  Learning to recognize when your body is reacting to stress and identifying our stressors are the first steps in managing stress. 2. Take a Break:  A change of pace, no matter how short, gives us a new outlook on old problems. Take a vacation 20 minutes a day - enjoy a change from the daily routine. 3. Learn to Relax:  Under stress, the muscles in our bodies stay tight. One of the most effective ways to combat tensions is deep muscle relaxation. Other techniques that produce muscle and mental relaxation are yoga, prayer, and deep breathing. 4. Be Nutritionally Aware:  Good nutrition is vital to optimum health, and is especially critical when we are under unusual stress, or going through a major life change. 5. Exercise Regularly:  Just like nutrition, exercise is imperative for maintaining good fitness. Whatever you enjoy - swimming, walking, jogging, aerobic exercise - will help you let off steam and work out stress. 6. Plan your Work:  Tension and anxiety really build up when our work seems endless. Plan your work to use time and energy more efficiently. Take one thing at a time. 7. Talk it Over: This may be the most important thing you can do for yourself if you cant get a handle on things. Find a good listener. Just as a pressure relief valve allows steam to flow out of a pressure cooker and keeps it from blowing up, so talking allows stress to flow out of the body and keeps us from blowing up. 8. Accept What You Cannot Change:  If the problem is beyond your control at this time, try your best to accept it until you can change it. It beats spinning your wheels and getting nowhere. 9. Evaluate Your Perceptions:  What we think is sometimes what we feel. If we constantly think unrealistic or alarming thoughts about ourselves or other folks, then our stress level is increased.     10. Relax Unrealistic Standards:  When we set unrealistic standards for ourselves, we usually can never reach them. If we do, we burn out quickly. Set reasonable goals and standards. 11. Reward Yourself:  Find ways to reward yourself when youve completed a minor or major task. We cannot always depend on others to recognize us, so we must develop our own reward system. 12. Become Assertive: Take steps to solve problems instead of feeling helpless. Distinguishing assertiveness (respecting others rights and your rights) from aggressiveness and passivity can do much to resolve internal stress. 13. Rediscover Humor:  Learn to laugh at yourself and your situation! 14. Increase Pleasurable Activities:  Take time to participate in fun, pleasurable, activities on a regular basis.

## 2020-10-27 ENCOUNTER — OFFICE VISIT (OUTPATIENT)
Dept: PSYCHOLOGY | Age: 50
End: 2020-10-27
Payer: OTHER GOVERNMENT

## 2020-10-27 DIAGNOSIS — E78.2 MIXED HYPERLIPIDEMIA: ICD-10-CM

## 2020-10-27 DIAGNOSIS — D72.819 LEUKOPENIA, UNSPECIFIED TYPE: ICD-10-CM

## 2020-10-27 LAB
ALBUMIN SERPL-MCNC: 4.6 G/DL (ref 3.5–5.2)
ALP BLD-CCNC: 53 U/L (ref 40–130)
ALT SERPL-CCNC: 14 U/L (ref 5–41)
ANION GAP SERPL CALCULATED.3IONS-SCNC: 9 MMOL/L (ref 7–19)
AST SERPL-CCNC: 12 U/L (ref 5–40)
BASOPHILS ABSOLUTE: 0 K/UL (ref 0–0.2)
BASOPHILS RELATIVE PERCENT: 0.6 % (ref 0–1)
BILIRUB SERPL-MCNC: 0.3 MG/DL (ref 0.2–1.2)
BUN BLDV-MCNC: 18 MG/DL (ref 6–20)
CALCIUM SERPL-MCNC: 9.2 MG/DL (ref 8.6–10)
CHLORIDE BLD-SCNC: 103 MMOL/L (ref 98–111)
CHOLESTEROL, TOTAL: 234 MG/DL (ref 160–199)
CO2: 28 MMOL/L (ref 22–29)
CREAT SERPL-MCNC: 0.9 MG/DL (ref 0.5–1.2)
EOSINOPHILS ABSOLUTE: 0.1 K/UL (ref 0–0.6)
EOSINOPHILS RELATIVE PERCENT: 1.8 % (ref 0–5)
GFR AFRICAN AMERICAN: >59
GFR NON-AFRICAN AMERICAN: >60
GLUCOSE BLD-MCNC: 97 MG/DL (ref 74–109)
HCT VFR BLD CALC: 46.8 % (ref 42–52)
HDLC SERPL-MCNC: 49 MG/DL (ref 55–121)
HEMOGLOBIN: 15.2 G/DL (ref 14–18)
IMMATURE GRANULOCYTES #: 0 K/UL
LDL CHOLESTEROL CALCULATED: 169 MG/DL
LYMPHOCYTES ABSOLUTE: 1.4 K/UL (ref 1.1–4.5)
LYMPHOCYTES RELATIVE PERCENT: 40.3 % (ref 20–40)
MCH RBC QN AUTO: 29.2 PG (ref 27–31)
MCHC RBC AUTO-ENTMCNC: 32.5 G/DL (ref 33–37)
MCV RBC AUTO: 89.8 FL (ref 80–94)
MONOCYTES ABSOLUTE: 0.4 K/UL (ref 0–0.9)
MONOCYTES RELATIVE PERCENT: 10.7 % (ref 0–10)
NEUTROPHILS ABSOLUTE: 1.6 K/UL (ref 1.5–7.5)
NEUTROPHILS RELATIVE PERCENT: 46.6 % (ref 50–65)
PDW BLD-RTO: 12.6 % (ref 11.5–14.5)
PLATELET # BLD: 227 K/UL (ref 130–400)
PMV BLD AUTO: 10 FL (ref 9.4–12.4)
POTASSIUM SERPL-SCNC: 4.7 MMOL/L (ref 3.5–5)
RBC # BLD: 5.21 M/UL (ref 4.7–6.1)
SODIUM BLD-SCNC: 140 MMOL/L (ref 136–145)
TOTAL PROTEIN: 7.2 G/DL (ref 6.6–8.7)
TRIGL SERPL-MCNC: 80 MG/DL (ref 0–149)
WBC # BLD: 3.4 K/UL (ref 4.8–10.8)

## 2020-10-27 PROCEDURE — 90834 PSYTX W PT 45 MINUTES: CPT | Performed by: SOCIAL WORKER

## 2020-10-27 NOTE — PROGRESS NOTES
This note will not be viewable in [employer] for the following reason(s). This is a Psychotherapy Note. Behavioral Health Consultation  Juaquin Edwards, [address] Consultant  10/27/2020  2:32 PM      Time spent with Patient: 45 minutes  This is patient's 8th  [facility] appointment.     Reason for Consult:          Chief Complaint   Patient presents with    Depression    Anxiety    Stress      Referring Provider: No referring provider defined for this encounter.        Feedback given to PCP.     S:  Patient reports problems with feeling stressed. A lot more noticeable anxiety. Everything is crazy and there is a lot going on that may contribute.      Addressed current and underlying issues, trauma and current affects on functioning, the changes because of the virus and associated stress, explored and released associated emotions, explored new ways to deal and cope with these problems.   Explored triggers, releasing, and coping with traumatic events wile working for the [employer]. I did take a week off and we painted in the house and it turned out good. It was detailed and good for me.         O:  MSE:     Mood    Anxious, higher than it was before COVID  guilt  Depressed  Low self-esteem  [place]  Affect    anxiety, intense  Appetite Fair  Sleep disturbance Yes, nightmares, hard time falling and staying asleep, worse in the last two weeks, tired  Fatigue Yes  Loss of pleasure Yes  Attention/Concentration    intact  Morbid ideation No  Suicide Assessment    no suicidal ideation           A:  Patient presents for consult due to problems with PTSD, depression, anxiety, chronic stressors stremming from exposure to stressful and traumatic events professionally and work related.     PHQ 9: 14     Continued consultation is clinically/medically necessary to support in learning new skills and build confidence to deal better with these issues. Patient response to consults, finds new strategies helpful.    Diagnosis:     1. PTSD (post-traumatic stress disorder)    2. YING (generalized anxiety disorder)    3. Moderate episode of recurrent major depressive disorder (HCC)                   Diagnosis Date    Basal cell carcinoma       scalp, chest    Chronic lower back pain      CPAP (continuous positive airway pressure) dependence      Hyperlipidemia      RILEY (obstructive sleep apnea)      Sciatica              Plan:  Pt interventions:  Trained in strategies for increasing balanced thinking, Discussed self-care (sleep, nutrition, rewarding activities, social support, exercise), Discussed use of imagery, distractions, relaxation, mood management, communication training, questioning unhelpful thinking, problem-solving, and behavioral activation to manage pain and Supportive techniques, Affirmation and Normalization. Education about effects of these kind of stressors.  CBT and supportive techniques.         Pt Behavioral Change Plan:  See Pt Instructions

## 2020-10-27 NOTE — PATIENT INSTRUCTIONS
Recommendations to patient:               7. Practice new coping, stress management, relaxation skills at least                   two times a day for at least 10-30 minutes.              2. Find at least one positive outlet per day that makes you feel better.              3. Talk things over with a good friend. Practice letting things go.              4. Stop, breathe, reset. \"I am ok. \"              9. I can find closure.               5. I can give my mind direction.              7. Stay in the moment.   [phone]. I can feel, when my body and my mind feels calmer.               9. Put things up at night, compartmentalize. 10. I can take the foot off the accelerator.      Scheduled follow up appointment.     Call for a sooner appointment if needed or if you need to change or cancel you appointment.     Allison [phone]       Nightmare Exposure and Rescripting    Exposure and rescripting are techniques that can help you to regain control over  nightmares and bad dreams. Exposure  We can make situations less fearful by confronting our fears. Imagine your nightmare is a movie script, that it has a beginning, middle, and end. Write down the story of what happens in your nightmare and read it through often. You can write it in words, or draw it out like a comic strip. Rescripting  We cant change events that have happened in our lives, but we can change the stories  we tell about them. Nightmares are just a story about something that has happened,  and our minds play that story at night as if its a video. If we change the story in important and memorable ways we can encourage our minds to play a dierent video. Follow these instructions for your nightmares. You may need do it multiple times if  there are lots of important moments. Once you have rescripted your nightmare it is  important to rehearse the new version so your mind will remember it while asleep.       Identify the worst moment of your nightmare  Where are you? What are you aware of? What is happening? What emotions are you feeling at that worst moment? Identify your emotions and what you feel in your body. Either during the nightmare or on waking    What would you prefer to feel in that moment? How would the story need to change for you to feel that way? Its your story, youre only limited by your imagination. The more creative, imaginative, or funny, the the changes that you make, the better - anything that makes your new story stand out will make it more memorable        The Relaxation and Stress Reduction Workbook by Magi Mars, PhD    The Anxiety and Phobia Workbook by Sukh Flores, PhD    The Self Esteem Workbook by Alisha Luke, PhD    The Anxiety and Worry Workbook by Duyen Castellon, PhD and Catalina Nunez. Melany Nettles MD      Apps for Anxiety/Relaxation/Mindfulness:    \"Calm\"    \"[id number]\"    \"PTSD \"    \"PARAG Self Help for Anxiety Management\"    \"MindShift\"    \"Relax Lite\"    \"Centered State\"    \"Deep Breathe\"    \"Aliza Bud\"    \"Headspace\"    \"What's Up? \" (grounding, breathing)    Anxiety In Order    Essence     Apps for Worry:    Worry Watch (1.99)    Worry Time (free)      Apps for Mood Monitoring/Tracking:    \"Optimism\"    \"Pacifica\"    \"T2 Mood Tracker\"    \"Moodtrack\"    Apps for Thought Tracking/Challenging: \"Thought Diary\"    \"Cricket\"      Gratitude Apps:   Happify     Gratitude! (visual gratitude journal)

## 2020-11-04 ENCOUNTER — TELEPHONE (OUTPATIENT)
Dept: FAMILY MEDICINE CLINIC | Age: 50
End: 2020-11-04

## 2020-11-04 ENCOUNTER — OFFICE VISIT (OUTPATIENT)
Dept: FAMILY MEDICINE CLINIC | Age: 50
End: 2020-11-04
Payer: OTHER GOVERNMENT

## 2020-11-04 VITALS
BODY MASS INDEX: 25.18 KG/M2 | HEIGHT: 69 IN | SYSTOLIC BLOOD PRESSURE: 124 MMHG | HEART RATE: 83 BPM | RESPIRATION RATE: 18 BRPM | WEIGHT: 170 LBS | OXYGEN SATURATION: 97 % | TEMPERATURE: 97 F | DIASTOLIC BLOOD PRESSURE: 68 MMHG

## 2020-11-04 PROBLEM — M17.12 PRIMARY OSTEOARTHRITIS OF LEFT KNEE: Status: ACTIVE | Noted: 2020-11-04

## 2020-11-04 PROCEDURE — 90471 IMMUNIZATION ADMIN: CPT | Performed by: NURSE PRACTITIONER

## 2020-11-04 PROCEDURE — 90686 IIV4 VACC NO PRSV 0.5 ML IM: CPT | Performed by: NURSE PRACTITIONER

## 2020-11-04 PROCEDURE — 99396 PREV VISIT EST AGE 40-64: CPT | Performed by: NURSE PRACTITIONER

## 2020-11-04 RX ORDER — ATORVASTATIN CALCIUM 10 MG/1
10 TABLET, FILM COATED ORAL DAILY
Qty: 30 TABLET | Refills: 5 | Status: SHIPPED | OUTPATIENT
Start: 2020-11-04 | End: 2021-04-28

## 2020-11-04 ASSESSMENT — ENCOUNTER SYMPTOMS
COUGH: 0
WHEEZING: 0
ABDOMINAL PAIN: 0
DIARRHEA: 0
CHEST TIGHTNESS: 0
SORE THROAT: 0
SHORTNESS OF BREATH: 0
NAUSEA: 0

## 2020-11-04 NOTE — PROGRESS NOTES
Joyce Johns is a 44 y.o. male who presents today for  Chief Complaint   Patient presents with    Annual Exam       HPI:  Here for physical.    Colonoscopy is UTD, December 2019, normal  Needs flu shot  He thinks he has had Tdap within the past 10 years    PTSD/YING is stable/controlled. Followed by Maryan Denton for counseling. He does not feel he needs any medication. He is followed by ortho in [place], Dr. Анна Hardy. He completed PT at [facility] in [place] for chronic shoulder pain and improved. He completed PT for chronic knee pain as well with some improvement. He received a synvisc injection in July with good results. His ROM has improved. He is exercising, running 3 miles 3 days a week and tolerating. Working without difficulty. Had MRI of L knee per ortho, showed mild arthritis per pt. RILEY is stable, controlled with cpap. He finally received his replacement supplies. Machine is working well, was not replaced. His wbc has been low x 2 years. No known h/o this prior. He has not had any unusual symptoms. Does not take medication other that occ ibuprofen or aleve and flonase. Lipids have worsened slightly. He has a family h/o hyperlipidemia. He has been watching diet, exercising. Weight is stable. Labs reviewed with pt.   Lab Results   Component Value Date    WBC 3.4 (L) 10/27/2020    HGB 15.2 10/27/2020    HCT 46.8 10/27/2020    MCV 89.8 10/27/2020     10/27/2020     Lab Results   Component Value Date     10/27/2020    K 4.7 10/27/2020     10/27/2020    CO2 28 10/27/2020    BUN 18 10/27/2020    CREATININE 0.9 10/27/2020    GLUCOSE 97 10/27/2020    CALCIUM 9.2 10/27/2020    PROT 7.2 10/27/2020    LABALBU 4.6 10/27/2020    BILITOT 0.3 10/27/2020    ALKPHOS 53 10/27/2020    AST 12 10/27/2020    ALT 14 10/27/2020    LABGLOM >60 10/27/2020    GFRAA >59 10/27/2020       Lab Results   Component Value Date    CHOL 234 (H) 10/27/2020    CHOL 208 (H) 11/05/2019     Lab EXCISION      WISDOM TOOTH EXTRACTION         Social History     Tobacco Use    Smoking status: Never Smoker    Smokeless tobacco: Never Used   Substance Use Topics    Alcohol use: Yes     Comment: 4-5 beers weekly     Drug use: Never       Family History   Problem Relation Age of Onset    COPD Mother     Cancer Mother         possible skin cancer    High Blood Pressure Father     High Cholesterol Paternal Grandmother     High Cholesterol Paternal Grandfather        /68   Pulse 83   Temp 97 °F (36.1 °C) (Temporal)   Resp 18   Ht 5' 9\" (1.753 m)   Wt 170 lb (77.1 kg)   SpO2 97%   BMI 25.10 kg/m²     Physical Exam  Vitals signs reviewed. Constitutional:       General: He is not in acute distress. Appearance: Normal appearance. He is well-developed. HENT:      Head: Normocephalic. Right Ear: Tympanic membrane and external ear normal.      Left Ear: Tympanic membrane and external ear normal.      Nose: Nose normal.      Mouth/Throat:      Mouth: Mucous membranes are moist.      Pharynx: No oropharyngeal exudate or posterior oropharyngeal erythema. Eyes:      Conjunctiva/sclera: Conjunctivae normal.      Pupils: Pupils are equal, round, and reactive to light. Neck:      Musculoskeletal: Normal range of motion and neck supple. Thyroid: No thyromegaly. Vascular: No carotid bruit or JVD. Trachea: No tracheal deviation. Cardiovascular:      Rate and Rhythm: Normal rate and regular rhythm. Heart sounds: Normal heart sounds. No murmur. Pulmonary:      Effort: Pulmonary effort is normal. No respiratory distress. Breath sounds: Normal breath sounds. Abdominal:      General: Abdomen is flat. Bowel sounds are normal. There is no distension. Palpations: Abdomen is soft. There is no mass. Tenderness: There is no abdominal tenderness. There is no guarding or rebound. Hernia: No hernia is present. Musculoskeletal: Normal range of motion. Lymphadenopathy:      Cervical: No cervical adenopathy. Skin:     General: Skin is warm and dry. Findings: No rash. Neurological:      General: No focal deficit present. Mental Status: He is alert. Psychiatric:         Mood and Affect: Mood normal.         Behavior: Behavior normal.         Thought Content: Thought content normal.         ASSESSMENT/PLAN:  1. Well adult exam  -Screenings reviewed, flu shot today  -He will check on last Tdap dose  - T4, Free; Future  - TSH without Reflex; Future    2. Mixed hyperlipidemia  -Has worsened slightly over the past year despite exercise and dietary changes. Discussed starting atorvastatin 10 mg daily. He is agreeable. SE profile reviewed. -Check fasting CMP and lipid in 6 weeks  - Comprehensive Metabolic Panel; Future  - Lipid Panel; Future    3. Leukopenia, unspecified type  -Has been ongoing for 2 years now, WBC slightly lower with recent lab. No clear etiology. Refer to hematology for evaluation  - Alana Lopez CNP, Hematology/Oncology, [place]    4. YING (generalized anxiety disorder)  -Stable, controlled with counseling. He does not feel he needs medication at this time. 5. Primary osteoarthritis of left knee  -Stable, improved with Synvisc injection per Ortho. Followed by Ortho. We will request last records and MRI. 6. RILEY (obstructive sleep apnea)  -Stable, controlled with CPAP    7. Prostate cancer screening  -Check PSA with lab in 6 weeks  - Psa screening; Future    8. Need for vaccination    - INFLUENZA, QUADV, 3 YRS AND OLDER, IM PF, PREFILL SYR OR SDV, 0.5ML (AFLURIA QUADV, PF)    I will see him back in 6 months for follow-up on hyperlipidemia. Return in about 6 months (around 5/4/2021) for follow up.     Idalia Villalobos was seen today for annual exam.    Diagnoses and all orders for this visit:    Need for vaccination  -     INFLUENZA, QUADV, 3 YRS AND OLDER, IM PF, PREFILL SYR OR SDV, 0.5ML (Sherron Page, PF)    Well adult exam  -     T4, Free; Future  -     TSH without Reflex; Future    Mixed hyperlipidemia  -     Comprehensive Metabolic Panel; Future  -     Lipid Panel; Future    Leukopenia, unspecified type  -     [facility] - Georgina Dense CNP, Hematology/Oncology, [place]    YING (generalized anxiety disorder)    Primary osteoarthritis of left knee    RILEY (obstructive sleep apnea)    Prostate cancer screening  -     Psa screening; Future    Other orders  -     atorvastatin (LIPITOR) 10 MG tablet; Take 1 tablet by mouth daily      There are no discontinued medications. There are no Patient Instructions on file for this visit. Patient voicesunderstanding and agrees to plans along with risks and benefits of plan. Counseling:  Jayden Downing's case, medications and options were discussed in detail. Patient was instructed to call the office if he questionsregarding him treatment. Should him conditions worsen, he should return to office to be reassessed by LUKE Araiza. he Should to go the closest Emergency Department for any emergency. They verbalizedunderstanding the above instructions. Return in about 6 months (around 5/4/2021) for follow up.

## 2020-11-04 NOTE — PROGRESS NOTES
Immunizations Administered     Name Date Dose Route    Influenza, Quadv, IM, PF (6 mo and older Fluzone, Flulaval, Fluarix, and 3 yrs and older Afluria) 11/4/2020 0.5 mL Intramuscular    Site: Deltoid- Left    Lot: [id number]    NDC: 87079-011-47

## 2020-11-04 NOTE — TELEPHONE ENCOUNTER
Please request last office note and MRI report from Dr. Dev Freire in [place]. Also last office note from Dr. Manuelito Valdez office.

## 2020-11-17 ENCOUNTER — HOSPITAL ENCOUNTER (OUTPATIENT)
Dept: INFUSION THERAPY | Age: 50
Discharge: HOME | End: 2020-11-17
Payer: OTHER GOVERNMENT

## 2020-11-17 ENCOUNTER — OFFICE VISIT (OUTPATIENT)
Dept: HEMATOLOGY | Age: 50
End: 2020-11-17
Payer: OTHER GOVERNMENT

## 2020-11-17 VITALS
HEART RATE: 73 BPM | OXYGEN SATURATION: 98 % | BODY MASS INDEX: 25.37 KG/M2 | WEIGHT: 171.3 LBS | SYSTOLIC BLOOD PRESSURE: 142 MMHG | TEMPERATURE: 96.8 F | HEIGHT: 69 IN | DIASTOLIC BLOOD PRESSURE: 80 MMHG

## 2020-11-17 DIAGNOSIS — D72.819 LEUKOPENIA, UNSPECIFIED TYPE: ICD-10-CM

## 2020-11-17 LAB
BASOPHILS ABSOLUTE: 0.01 K/UL (ref 0.01–0.08)
BASOPHILS RELATIVE PERCENT: 0.3 % (ref 0.1–1.2)
EOSINOPHILS ABSOLUTE: 0.03 K/UL (ref 0.04–0.54)
EOSINOPHILS RELATIVE PERCENT: 0.8 % (ref 0.7–7)
HCT VFR BLD CALC: 52.1 % (ref 40.1–51)
HEMOGLOBIN: 17.1 G/DL (ref 13.7–17.5)
LYMPHOCYTES ABSOLUTE: 1.29 K/UL (ref 1.18–3.74)
LYMPHOCYTES RELATIVE PERCENT: 34.4 % (ref 19.3–53.1)
MCH RBC QN AUTO: 30.3 PG (ref 25.7–32.2)
MCHC RBC AUTO-ENTMCNC: 32.8 G/DL (ref 32.3–36.5)
MCV RBC AUTO: 92.2 FL (ref 79–92.2)
MONOCYTES ABSOLUTE: 0.43 K/UL (ref 0.24–0.82)
MONOCYTES RELATIVE PERCENT: 11.5 % (ref 4.7–12.5)
NEUTROPHILS ABSOLUTE: 1.99 K/UL (ref 1.56–6.13)
NEUTROPHILS RELATIVE PERCENT: 53 % (ref 34–71.1)
PDW BLD-RTO: 12.8 % (ref 11.6–14.4)
PLATELET # BLD: 176 K/UL (ref 163–337)
PMV BLD AUTO: 10.7 FL (ref 7.4–10.4)
RBC # BLD: 5.65 M/UL (ref 4.63–6.08)
WBC # BLD: 3.75 K/UL (ref 4.23–9.07)

## 2020-11-17 PROCEDURE — 85025 COMPLETE CBC W/AUTO DIFF WBC: CPT

## 2020-11-17 PROCEDURE — 99203 OFFICE O/P NEW LOW 30 MIN: CPT | Performed by: NURSE PRACTITIONER

## 2020-11-17 PROCEDURE — 99212 OFFICE O/P EST SF 10 MIN: CPT

## 2020-11-17 ASSESSMENT — ENCOUNTER SYMPTOMS
CONSTIPATION: 0
EYE DISCHARGE: 0
DIARRHEA: 0
NAUSEA: 0
SORE THROAT: 0
WHEEZING: 0
ABDOMINAL PAIN: 0
VOMITING: 0
TROUBLE SWALLOWING: 0
COUGH: 0
SHORTNESS OF BREATH: 0
EYE ITCHING: 0

## 2020-11-17 NOTE — PROGRESS NOTES
OP HEMATOLOGY/ONCOLOGY CONSULTATION      Pt Name: Inez Bolivar  YOB: 1970  MRN: [id number]  Referring provider: LUKE Hernandez  Requesting provider: LUKE Hoover  Reason for consultation: Leukopenia  Date of evaluation: 11/17/2020    History Obtained From:  patient, electronic medical record    CHIEF COMPLAINT:    Chief Complaint   Patient presents with    New Patient     Leukopenia, unspecified type     HISTORY OF PRESENT ILLNESS:    Jasper Kohler is a 44 y.o.  male referred to the clinic by LUKE Hoover for evaluation of leukopenia. He is seen in initial hematology consultation on 11/17/2020. PMH significant for PTSD, YING, osteoarthritis, RILEY and basal cell carcinoma of the scalp (2016 in [place], [place]), followed by Dr. Dennise Vallecillo. Rodrick Storm was seen by LUKE Hoover 11/4/2020 for annual exam.    He was noted to have a persistently low WBC the past 2 years. CBC trend:      CBC 11/17/2020 at initial hematology consultation: WBC 3.75, Hgb/HCT 17.1/52.1, platelets 811,656. Differentials are normal.    Rodrick Storm denies complaint. He is feeling well, stable on current medications. He has no B symptoms. His only complaint is of arthritis. 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 He takes a atorvastatin for history of hyperlipidemia. Rodrick Storm denies family history of blood disorders or cancers. Possible causes are discussed. Baseline serology and ultrasound of the spleen requested. Rodrick Storm reports he will bring copies of prior labs from the [facility] for review. He is uncertain when he had his last hepatitis/HIV screen. Will defer drawing at present, per patient request.  Rodrick Storm will check records if not completed recently, he will contact the office. Colonoscopy is up-to-date last completed 12/10/2019, negative according to patient.     Past Medical History:   Diagnosis Date    Basal cell Tried to call again memory is still full   carcinoma     scalp, chest    Chronic lower back pain     CPAP (continuous positive airway pressure) dependence     YING (generalized anxiety disorder) 5/4/2020    Hyperlipidemia     RILEY (obstructive sleep apnea)     PTSD (post-traumatic stress disorder) 5/4/2020    Sciatica      Past Surgical History:   Procedure Laterality Date    COLONOSCOPY N/A 12/10/2019    Dr Paolo Duong hemorrhoids-Grade 1, 10 yr recall    PRE-MALIGNANT / BENIGN SKIN LESION EXCISION      WISDOM TOOTH EXTRACTION         Current Outpatient Medications:     atorvastatin (LIPITOR) 10 MG tablet, Take 1 tablet by mouth daily, Disp: 30 tablet, Rfl: 5    fluticasone (FLONASE) 50 MCG/ACT nasal spray, SHAKE LIQUID AND USE 2 SPRAYS IN EACH NOSTRIL DAILY, Disp: 16 g, Rfl: 3   Allergies: Allergies   Allergen Reactions    Sulfa Antibiotics Hives     Social History     Tobacco Use    Smoking status: Never Smoker    Smokeless tobacco: Never Used   Substance Use Topics    Alcohol use: Yes     Alcohol/week: 7.0 standard drinks     Types: 7 Cans of beer per week    Drug use: Never     Family History   Problem Relation Age of Onset    COPD Mother     Cancer Mother         possible skin cancer    High Blood Pressure Father     Heart Disease Father     High Cholesterol Paternal Grandmother     High Cholesterol Paternal Grandfather     Heart Disease Paternal Grandfather      Health Maintenance   Topic Date Due    HIV screen  08/07/1983    DTaP/Tdap/Td vaccine (1 - Tdap) 08/07/1987    Shingles Vaccine (1 of 2) 08/07/2018    Lipid screen  10/27/2021    Colon cancer screen colonoscopy  12/10/2029    Flu vaccine  Completed    Hepatitis A vaccine  Aged Out    Hepatitis B vaccine  Aged Out    Hib vaccine  Aged Out    Meningococcal (ACWY) vaccine  Aged Out    Pneumococcal 0-64 years Vaccine  Aged Out     Subjective   Review of Systems   Constitutional: Negative for fatigue and fever.         No night sweats   HENT: Negative for dental problem, hearing loss, mouth sores, nosebleeds, sore throat and trouble swallowing. Eyes: Negative for discharge and itching. Respiratory: Negative for cough, shortness of breath and wheezing. No hemoptysis   Cardiovascular: Negative for chest pain, palpitations and leg swelling. Gastrointestinal: Negative for abdominal pain, constipation, diarrhea, nausea and vomiting. Endocrine: Negative for cold intolerance and heat intolerance. Genitourinary: Negative for dysuria, frequency, hematuria and urgency. Musculoskeletal: Positive for arthralgias. Negative for joint swelling and myalgias. Skin: Negative for pallor and rash. Allergic/Immunologic: Negative for environmental allergies and immunocompromised state. Neurological: Negative for seizures, syncope and numbness. Hematological: Negative for adenopathy. Does not bruise/bleed easily. Psychiatric/Behavioral: Negative for agitation, behavioral problems and confusion. The patient is not nervous/anxious. H/o anxiety, PTSD - stable     Objective   Physical Exam  Vitals signs reviewed. Constitutional:       General: He is not in acute distress. Appearance: He is well-developed. He is not toxic-appearing or diaphoretic. HENT:      Head: Normocephalic and atraumatic. Right Ear: External ear normal.      Left Ear: External ear normal.      Nose: Nose normal.      Mouth/Throat:      Mouth: Mucous membranes are moist.   Eyes:      General: No scleral icterus. Right eye: No discharge. Left eye: No discharge. Conjunctiva/sclera: Conjunctivae normal.   Neck:      Musculoskeletal: Neck supple. Trachea: No tracheal deviation. Cardiovascular:      Rate and Rhythm: Normal rate and regular rhythm. Pulmonary:      Effort: Pulmonary effort is normal. No respiratory distress. Breath sounds: Normal breath sounds. No wheezing or rales.    Abdominal:      General: Bowel sounds are normal. There is no distension. Palpations: Abdomen is soft. Tenderness: There is no abdominal tenderness. There is no guarding. Genitourinary:     Comments: Exam deferred  Musculoskeletal:         General: No tenderness or deformity. Comments: Normal ROM all four extremities   Lymphadenopathy:      Cervical: No cervical adenopathy. Right cervical: No superficial cervical adenopathy. Left cervical: No superficial cervical adenopathy. Upper Body:      Right upper body: No supraclavicular adenopathy. Left upper body: No supraclavicular adenopathy. Comments: No bulky palpable cervical, clavicular, axillary or inguinal adenopathies on the left or right. Skin:     General: Skin is warm and dry. Findings: No rash. Neurological:      Mental Status: He is alert and oriented to person, place, and time. Comments: follows commands, non-focal   Psychiatric:         Behavior: Behavior normal. Behavior is cooperative. Thought Content: Thought content normal.         Judgment: Judgment normal.      Comments: Alert and oriented to person, place and time. BP (!) 142/80   Pulse 73   Temp 96.8 °F (36 °C) (Temporal)   Ht 5' 9\" (1.753 m)   Wt 171 lb 4.8 oz (77.7 kg)   SpO2 98%   BMI 25.30 kg/m²   Wt Readings from Last 3 Encounters:   11/17/20 171 lb 4.8 oz (77.7 kg)   11/04/20 170 lb (77.1 kg)   05/04/20 167 lb (75.8 kg)     Labs reviewed by me:    CBC 11/17/20: WBC 3.75, Hgb/HCT 17.1/52.1, platelets 171,607    ASSESSMENT/PLAN:    1. Leukopenia, unspecified type    2. History of basal cell carcinoma (BCC) of skin    3. Arthritis        Orders Placed This Encounter   Procedures    US SPLEEN    Vitamin B12    Folate    LEIGHTON Screen with Reflex    Miscellaneous Sendout 1 - PBS     Orders for serology given to Rafael Cordova. He will have completed with upcoming labs for LUKE Pastrana. Will request HIV/hepatitis panel if not completed recently.     H/H on upper limit of normal, encourage adequate hydration  Will follow    Lois Gutierrez is followed by dermatologist, Dr. Eduardo Zamudio regarding history of basal cell carcinoma of the scalp. Return in about 4 weeks (around 12/15/2020) for follow up with LUKE Alejandra.          LUKE Kitchen  4:16 PM  11/17/2020

## 2020-11-19 ENCOUNTER — HOSPITAL ENCOUNTER (OUTPATIENT)
Dept: ULTRASOUND IMAGING | Age: 50
Discharge: HOME | End: 2020-11-19
Payer: OTHER GOVERNMENT

## 2020-11-19 ENCOUNTER — HOSPITAL ENCOUNTER (OUTPATIENT)
Dept: LAB | Age: 50
Discharge: HOME | End: 2020-11-19
Payer: OTHER GOVERNMENT

## 2020-11-19 DIAGNOSIS — Z00.00 WELL ADULT EXAM: ICD-10-CM

## 2020-11-19 DIAGNOSIS — Z12.5 PROSTATE CANCER SCREENING: ICD-10-CM

## 2020-11-19 DIAGNOSIS — E78.2 MIXED HYPERLIPIDEMIA: ICD-10-CM

## 2020-11-19 LAB
ALBUMIN SERPL-MCNC: 4.9 G/DL (ref 3.5–5.2)
ALP BLD-CCNC: 51 U/L (ref 40–130)
ALT SERPL-CCNC: 13 U/L (ref 5–41)
ANION GAP SERPL CALCULATED.3IONS-SCNC: 11 MMOL/L (ref 7–19)
AST SERPL-CCNC: 13 U/L (ref 5–40)
BILIRUB SERPL-MCNC: 0.3 MG/DL (ref 0.2–1.2)
BUN BLDV-MCNC: 17 MG/DL (ref 6–20)
CALCIUM SERPL-MCNC: 9.2 MG/DL (ref 8.6–10)
CHLORIDE BLD-SCNC: 103 MMOL/L (ref 98–111)
CHOLESTEROL, TOTAL: 174 MG/DL (ref 160–199)
CO2: 27 MMOL/L (ref 22–29)
CREAT SERPL-MCNC: 0.9 MG/DL (ref 0.5–1.2)
FOLATE: 11.3 NG/ML (ref 4.5–32.2)
GFR AFRICAN AMERICAN: >59
GFR NON-AFRICAN AMERICAN: >60
GLUCOSE BLD-MCNC: 100 MG/DL (ref 74–109)
HDLC SERPL-MCNC: 49 MG/DL (ref 55–121)
LDL CHOLESTEROL CALCULATED: 110 MG/DL
POTASSIUM SERPL-SCNC: 4.4 MMOL/L (ref 3.5–5)
PROSTATE SPECIFIC ANTIGEN: 0.34 NG/ML (ref 0–4)
SODIUM BLD-SCNC: 141 MMOL/L (ref 136–145)
T4 FREE: 1.23 NG/DL (ref 0.93–1.7)
TOTAL PROTEIN: 7.3 G/DL (ref 6.6–8.7)
TRIGL SERPL-MCNC: 74 MG/DL (ref 0–149)
TSH SERPL DL<=0.05 MIU/L-ACNC: 2.69 UIU/ML (ref 0.27–4.2)
VITAMIN B-12: 254 PG/ML (ref 211–946)

## 2020-11-19 PROCEDURE — 76705 ECHO EXAM OF ABDOMEN: CPT

## 2020-11-20 LAB — ANA IGG, ELISA: NORMAL

## 2020-12-01 ENCOUNTER — OFFICE VISIT (OUTPATIENT)
Dept: PSYCHOLOGY | Age: 50
End: 2020-12-01
Payer: OTHER GOVERNMENT

## 2020-12-01 PROCEDURE — 90834 PSYTX W PT 45 MINUTES: CPT | Performed by: SOCIAL WORKER

## 2020-12-01 NOTE — PATIENT INSTRUCTIONS
RAMOS LEWIS 10/27/2020 2:10 PM Addendum        Recommendations to patient:               3. Practice new coping, stress management, relaxation skills at least                   two times a day for at least 10-30 minutes.              2. Find at least one positive outlet per day that makes you feel better.              3. Talk things over with a good friend. Practice letting things go.              4. Stop, breathe, reset. \"I am ok. \"              2. I can find closure.               3. I can give my mind direction.              7. Stay in the moment.   [phone]. I can feel, when my body and my mind feels calmer.   [phone]. Put things up at night, compartmentalize.               10.  I can take the foot off the accelerator.      Scheduled follow up appointment.     Call for a sooner appointment if needed or if you need to change or cancel you appointment.

## 2020-12-01 NOTE — PROGRESS NOTES
This note will not be viewable in [employer] for the following reason(s). This is a Psychotherapy Note. Behavioral Health Consultation  Johnathon Boggs, [address] [facility] Consultant  12/1/2020  10:22 AM      Time spent with Patient: 45 minutes  This is patient's 9th  [facility] appointment.     Reason for Consult:          Chief Complaint   Patient presents with    Depression    Anxiety    Stress      Referring Provider: No referring provider defined for this encounter.        Feedback given to PCP.     S:  Patient reports problems with feeling stressed. COVID anf affects on everything. Work. Stress. Holidays. Talked to family on the phone. Disclosed and released about difficult history. Losses. Had some really good nightmares in the last month, and I tried to give them a different ending, woke me again. Then I redirected again, it helps.      Addressed current and underlying issues, trauma and current affects on functioning, the changes because of the virus and associated stress, explored and released associated emotions, explored new ways to deal and cope with these problems.   Explored triggers, releasing, and coping with traumatic events allen working for the [employer]    I reached out to family and friends.           O:  MSE:     Mood    Anxious, higher than it was before COVID  guilt  Depressed  Low self-esteem  [place]  Affect    anxiety, intense  Appetite Fair  Sleep disturbance Yes, nightmares, hard time falling and staying asleep, worse in the last two weeks, tired  Fatigue Yes  Loss of pleasure Yes  Attention/Concentration    intact  Morbid ideation No  Suicide Assessment    no suicidal ideation           A:  Patient presents for consult due to problems with PTSD, depression, anxiety, chronic stressors stremming from exposure to stressful and traumatic events professionally and work related.       Continued consultation is clinically/medically necessary to support in learning new skills and build confidence to deal better with these issues. Patient response to consults, finds new strategies helpful.      Diagnosis:     1. PTSD (post-traumatic stress disorder)    2. YING (generalized anxiety disorder)    3. Moderate episode of recurrent major depressive disorder (HCC)                   Diagnosis Date    Basal cell carcinoma       scalp, chest    Chronic lower back pain      CPAP (continuous positive airway pressure) dependence      Hyperlipidemia      RILEY (obstructive sleep apnea)      Sciatica              Plan:  Pt interventions:  Trained in strategies for increasing balanced thinking, Discussed self-care (sleep, nutrition, rewarding activities, social support, exercise), Discussed use of imagery, distractions, relaxation, mood management, communication training, questioning unhelpful thinking, problem-solving, and behavioral activation to manage pain and Supportive techniques, Affirmation and Normalization. Education about effects of these kind of stressors.  CBT and supportive techniques.         Pt Behavioral Change Plan:  See Pt Instructions  Ishmael Rueda [phone]

## 2020-12-21 ENCOUNTER — HOSPITAL ENCOUNTER (OUTPATIENT)
Dept: INFUSION THERAPY | Age: 50
Discharge: HOME | End: 2020-12-21
Payer: OTHER GOVERNMENT

## 2020-12-21 ENCOUNTER — OFFICE VISIT (OUTPATIENT)
Dept: HEMATOLOGY | Age: 50
End: 2020-12-21
Payer: OTHER GOVERNMENT

## 2020-12-21 VITALS
HEIGHT: 69 IN | BODY MASS INDEX: 25.25 KG/M2 | OXYGEN SATURATION: 97 % | TEMPERATURE: 97.7 F | DIASTOLIC BLOOD PRESSURE: 88 MMHG | SYSTOLIC BLOOD PRESSURE: 132 MMHG | HEART RATE: 83 BPM | WEIGHT: 170.5 LBS

## 2020-12-21 DIAGNOSIS — D72.819 LEUKOPENIA, UNSPECIFIED TYPE: ICD-10-CM

## 2020-12-21 LAB
BASOPHILS ABSOLUTE: 0.02 K/UL (ref 0.01–0.08)
BASOPHILS RELATIVE PERCENT: 0.3 % (ref 0.1–1.2)
EOSINOPHILS ABSOLUTE: 0.06 K/UL (ref 0.04–0.54)
EOSINOPHILS RELATIVE PERCENT: 1 % (ref 0.7–7)
HCT VFR BLD CALC: 49.9 % (ref 40.1–51)
HEMOGLOBIN: 16.3 G/DL (ref 13.7–17.5)
LYMPHOCYTES ABSOLUTE: 1.54 K/UL (ref 1.18–3.74)
LYMPHOCYTES RELATIVE PERCENT: 25.4 % (ref 19.3–53.1)
MCH RBC QN AUTO: 30.7 PG (ref 25.7–32.2)
MCHC RBC AUTO-ENTMCNC: 32.7 G/DL (ref 32.3–36.5)
MCV RBC AUTO: 94 FL (ref 79–92.2)
MONOCYTES ABSOLUTE: 0.66 K/UL (ref 0.24–0.82)
MONOCYTES RELATIVE PERCENT: 10.9 % (ref 4.7–12.5)
NEUTROPHILS ABSOLUTE: 3.78 K/UL (ref 1.56–6.13)
NEUTROPHILS RELATIVE PERCENT: 62.4 % (ref 34–71.1)
PDW BLD-RTO: 12.9 % (ref 11.6–14.4)
PLATELET # BLD: 170 K/UL (ref 163–337)
PMV BLD AUTO: 10.3 FL (ref 7.4–10.4)
RBC # BLD: 5.31 M/UL (ref 4.63–6.08)
WBC # BLD: 6.06 K/UL (ref 4.23–9.07)

## 2020-12-21 PROCEDURE — 99211 OFF/OP EST MAY X REQ PHY/QHP: CPT

## 2020-12-21 PROCEDURE — 99213 OFFICE O/P EST LOW 20 MIN: CPT | Performed by: NURSE PRACTITIONER

## 2020-12-21 PROCEDURE — 85025 COMPLETE CBC W/AUTO DIFF WBC: CPT

## 2020-12-21 ASSESSMENT — ENCOUNTER SYMPTOMS
SORE THROAT: 0
ABDOMINAL PAIN: 0
COUGH: 0
DIARRHEA: 0
SHORTNESS OF BREATH: 0
CONSTIPATION: 0
EYE DISCHARGE: 0
EYE ITCHING: 0
WHEEZING: 0
NAUSEA: 0
VOMITING: 0
TROUBLE SWALLOWING: 0

## 2020-12-21 NOTE — PROGRESS NOTES
OP HEMATOLOGY/ONCOLOGY CONSULTATION      Pt Name: Nelson Unger  YOB: 1970  MRN: [id number]  Referring provider: LUKE Harrington  Requesting provider: LUKE Dunbar  Reason for consultation: Leukopenia  Date of evaluation: 12/21/2020    History Obtained From:  patient, electronic medical record    CHIEF COMPLAINT:    Chief Complaint   Patient presents with    Follow-up     Leukopenia, unspecified type     HISTORY OF PRESENT ILLNESS:    Mitchell Marilia" Saint Maya is a 44 y.o.  male returning to the clinic to review serology completed for leukopenia. Naomi Dorsey denies infections, hospitalizations or changes in health history since last evaluated. He brings with him a copy of past medical records with a summary of WBC counts ranging from 2.9-5.09, dating to 12. HIV testing was negative in 2016. He has received hepatitis vaccine though denies recent hepatitis testing. CBC today, 12/21/2020 includes a WBC of 6.06 with normal differentials. HEMATOLOGY HISTORY: Leukopenia  Mitchell Capellan: Saint Maya was seen in initial hematology consultation 11/17/2020, referred by LUKE Dunbar for evaluation of leukopenia. PMH significant for PTSD, YING, osteoarthritis, RILEY and basal cell carcinoma of the scalp (2016 in [facility]), followed by Dr. Peter Mayers. Naomi Dorsey was seen by LUKE Dunbar 11/4/2020 for annual exam.  He was noted to have a persistently low WBC for the past 2 years. CBC trend:      CBC 11/17/2020 at initial hematology consultation: WBC 3.75, Hgb/HCT 17.1/52.1, platelets 970,795. Differentials normal.    Naomi Dorsey denied complaint. He felt well, stable on current medications. He has no B symptoms. His only complaint was of arthritis. 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 He takes atorvastatin for history of hyperlipidemia. Naomi Dorsey denies family history of blood disorders or cancers.     Possible causes are  DTaP/Tdap/Td vaccine (1 - Tdap) 08/07/1987    Shingles Vaccine (1 of 2) 08/07/2018    Lipid screen  11/19/2021    Colon cancer screen colonoscopy  12/10/2029    Flu vaccine  Completed    Hepatitis A vaccine  Aged Out    Hepatitis B vaccine  Aged Out    Hib vaccine  Aged Out    Meningococcal (ACWY) vaccine  Aged Out    Pneumococcal 0-64 years Vaccine  Aged Out     Subjective   Review of Systems   Constitutional: Negative for fatigue and fever. HENT: Negative for dental problem, hearing loss, mouth sores, nosebleeds, sore throat and trouble swallowing. Eyes: Negative for discharge and itching. No blurring of vision, no double vision   Respiratory: Negative for cough, shortness of breath and wheezing. Cardiovascular: Negative for chest pain, palpitations and leg swelling. Gastrointestinal: Negative for abdominal pain, constipation, diarrhea, nausea and vomiting. Endocrine: Negative for cold intolerance and heat intolerance. Genitourinary: Negative for dysuria, frequency, hematuria and urgency. Musculoskeletal: Negative for arthralgias, joint swelling and myalgias. Skin: Negative for pallor and rash. Allergic/Immunologic: Negative for environmental allergies and immunocompromised state. Neurological: Negative for seizures, syncope and numbness. Hematological: Negative for adenopathy. Does not bruise/bleed easily. Psychiatric/Behavioral: Negative for agitation, behavioral problems, confusion and suicidal ideas. The patient is not nervous/anxious. Objective   Physical Exam  Vitals signs reviewed. Constitutional:       General: He is not in acute distress. Appearance: He is well-developed. He is not toxic-appearing or diaphoretic. HENT:      Head: Normocephalic and atraumatic.       Right Ear: External ear normal.      Left Ear: External ear normal.      Nose: Nose normal.      Mouth/Throat:      Mouth: Mucous membranes are moist.   Eyes:      General: No scleral icterus. Right eye: No discharge. Left eye: No discharge. Conjunctiva/sclera: Conjunctivae normal.   Neck:      Musculoskeletal: Neck supple. Trachea: No tracheal deviation. Cardiovascular:      Rate and Rhythm: Normal rate and regular rhythm. Pulmonary:      Effort: Pulmonary effort is normal. No respiratory distress. Breath sounds: Normal breath sounds. No wheezing or rales. Abdominal:      General: Bowel sounds are normal. There is no distension. Palpations: Abdomen is soft. Tenderness: There is no abdominal tenderness. There is no guarding. Genitourinary:     Comments: Exam deferred  Musculoskeletal:         General: No tenderness or deformity. Comments: Normal ROM all four extremities   Lymphadenopathy:      Cervical: No cervical adenopathy. Right cervical: No superficial cervical adenopathy. Left cervical: No superficial cervical adenopathy. Upper Body:      Right upper body: No supraclavicular adenopathy. Left upper body: No supraclavicular adenopathy. Comments: No bulky palpable cervical, clavicular, axillary or inguinal adenopathies on the left or right. Skin:     General: Skin is warm and dry. Findings: No rash. Neurological:      Mental Status: He is alert and oriented to person, place, and time. Comments: follows commands, non-focal   Psychiatric:         Behavior: Behavior normal. Behavior is cooperative. Thought Content: Thought content normal.         Judgment: Judgment normal.      Comments: Alert and oriented to person, place and time. /88   Pulse 83   Temp 97.7 °F (36.5 °C) (Temporal)   Ht 5' 9\" (1.753 m)   Wt 170 lb 8 oz (77.3 kg)   SpO2 97%   BMI 25.18 kg/m²   Wt Readings from Last 3 Encounters:   12/21/20 170 lb 8 oz (77.3 kg)   11/17/20 171 lb 4.8 oz (77.7 kg)   11/04/20 170 lb (77.1 kg)     Labs reviewed by me:    CBC 12/21/20: WBC 6.06, Hgb 16.3/MCV 94.0, platelets 977,294.

## 2021-01-19 ENCOUNTER — OFFICE VISIT (OUTPATIENT)
Dept: PSYCHOLOGY | Age: 51
End: 2021-01-19
Payer: OTHER GOVERNMENT

## 2021-01-19 DIAGNOSIS — F41.1 GAD (GENERALIZED ANXIETY DISORDER): Primary | ICD-10-CM

## 2021-01-19 DIAGNOSIS — F43.10 PTSD (POST-TRAUMATIC STRESS DISORDER): ICD-10-CM

## 2021-01-19 DIAGNOSIS — F33.0 MILD EPISODE OF RECURRENT MAJOR DEPRESSIVE DISORDER (HCC): ICD-10-CM

## 2021-01-19 PROCEDURE — 90834 PSYTX W PT 45 MINUTES: CPT | Performed by: SOCIAL WORKER

## 2021-01-19 NOTE — PATIENT INSTRUCTIONS
Recommendations to patient:               3. Practice new coping, stress management, relaxation skills at least                   two times a day for at least 10-30 minutes.              2. Find at least one positive outlet per day that makes you feel better.              3. Talk things over with a good friend. Practice letting things go.              4. Stop, breathe, reset. \"I am ok. \"              3. I can find closure.               2. I can give my mind direction.              7. Stay in the moment.   [phone]. I can feel, when my body and my mind feels calmer.   [phone]. Put things up at night, compartmentalize.               10. I can take the foot off the accelerator.      Scheduled follow up appointment.     Call for a sooner appointment if needed or if you need to change or cancel you appointment. Nightmare Exposure and Rescripting    Exposure and rescripting are techniques that can help you to regain control over  nightmares and bad dreams. Exposure  We can make situations less fearful by confronting our fears. Imagine your nightmare is a movie script, that it has a beginning, middle, and end. Write down the story of what happens in your nightmare and read it through often. You can write it in words, or draw it out like a comic strip. Rescripting  We cant change events that have happened in our lives, but we can change the stories  we tell about them. Nightmares are just a story about something that has happened,  and our minds play that story at night as if its a video. If we change the story in important and memorable ways we can encourage our minds to play a dierent video. Follow these instructions for your nightmares. You may need do it multiple times if  there are lots of important moments. Once you have rescripted your nightmare it is  important to rehearse the new version so your mind will remember it while asleep. Identify the worst moment of your nightmare  Where are you? What are you aware of? What is happening? What emotions are you feeling at that worst moment? Identify your emotions and what you feel in your body. Either during the nightmare or on waking    What would you prefer to feel in that moment? How would the story need to change for you to feel that way? Its your story, youre only limited by your imagination.  The more creative, imaginative, or funny, the the changes that you make, the better - anything that makes your new story stand out will make it more memorable

## 2021-01-19 NOTE — PROGRESS NOTES
This note will not be viewable in [employer] for the following reason(s). This is a Psychotherapy Note. Behavioral Health Consultation  Tez Hill, [address] [facility] Consultant  1/19/2021  9:21 AM      Time spent with Patient: 45 minutes  This is patient's 10th  [facility] appointment.     Reason for Consult:          Chief Complaint   Patient presents with    Depression    Anxiety    Stress      Referring Provider: No referring provider defined for this encounter.        Feedback given to PCP.     S:  Patient reports problems with feeling stressed. Been doing ok, trouble sleeping, had a nightmare and wet myself. It was intense and embarrassing. The man in the white shirt, it was different. At that time, I already had mental issues, was triggered. I have a hard time coming to terms with that. Political situation and COVID anf affects on everything. Work. Stress.      Addressed current and underlying issues, trauma and current affects on functioning, the changes because of the virus and associated stress, explored and released associated emotions, explored new ways to deal and cope with these problems.   Explored triggers, releasing, and coping with traumatic events allen working for the [employer]     I am going in a direction to file for penitentiary.  There are events triggered by that process of decision making.       O:  MSE:     Mood    Anxious  guilt  Depressed  Low self-esteem  [place]  Affect    anxiety, intense  Appetite Fair  Sleep disturbance Yes, nightmares, hard time falling and staying asleep, worse in the last two weeks, tired  Fatigue Yes  Loss of pleasure Yes  Attention/Concentration    intact  Morbid ideation No  Suicide Assessment    no suicidal ideation           A:  Patient presents for consult due to problems with PTSD, depression, anxiety, chronic stressors stremming from exposure to stressful and traumatic events professionally and work related.       Continued consultation is clinically/medically necessary to support in learning new skills and build confidence to deal better with these issues. Patient response to consults, finds new strategies helpful.      Diagnosis:     1. PTSD (post-traumatic stress disorder)    2. YING (generalized anxiety disorder)    3. Mild episode of recurrent major depressive disorder (HCC)                   Diagnosis Date    Basal cell carcinoma       scalp, chest    Chronic lower back pain      CPAP (continuous positive airway pressure) dependence      Hyperlipidemia      RILEY (obstructive sleep apnea)      Sciatica              Plan:  Pt interventions:  Trained in strategies for increasing balanced thinking, Discussed self-care (sleep, nutrition, rewarding activities, social support, exercise), Discussed use of imagery, distractions, relaxation, mood management, communication training, questioning unhelpful thinking, problem-solving, and behavioral activation to manage pain and Supportive techniques, Affirmation and Normalization. Education about effects of these kind of stressors.  CBT and supportive techniques.         Pt Behavioral Change Plan:  See Pt Instructions

## 2021-01-20 ENCOUNTER — OFFICE VISIT (OUTPATIENT)
Dept: FAMILY MEDICINE CLINIC | Age: 51
End: 2021-01-20
Payer: OTHER GOVERNMENT

## 2021-01-20 VITALS
TEMPERATURE: 97.6 F | RESPIRATION RATE: 18 BRPM | DIASTOLIC BLOOD PRESSURE: 84 MMHG | SYSTOLIC BLOOD PRESSURE: 122 MMHG | WEIGHT: 171 LBS | OXYGEN SATURATION: 98 % | HEART RATE: 77 BPM | HEIGHT: 69 IN | BODY MASS INDEX: 25.33 KG/M2

## 2021-01-20 DIAGNOSIS — M25.521 ELBOW PAIN, RIGHT: ICD-10-CM

## 2021-01-20 DIAGNOSIS — Z23 NEED FOR VACCINATION: Primary | ICD-10-CM

## 2021-01-20 DIAGNOSIS — M25.522 ELBOW PAIN, LEFT: ICD-10-CM

## 2021-01-20 DIAGNOSIS — M77.8 BILATERAL ELBOW TENDONITIS: ICD-10-CM

## 2021-01-20 PROCEDURE — 99213 OFFICE O/P EST LOW 20 MIN: CPT | Performed by: NURSE PRACTITIONER

## 2021-01-20 RX ORDER — ZOSTER VACCINE RECOMBINANT, ADJUVANTED 50 MCG/0.5
0.5 KIT INTRAMUSCULAR SEE ADMIN INSTRUCTIONS
Qty: 0.5 ML | Refills: 0 | Status: SHIPPED | OUTPATIENT
Start: 2021-01-20 | End: 2021-07-19

## 2021-01-20 ASSESSMENT — ENCOUNTER SYMPTOMS
WHEEZING: 0
SORE THROAT: 0
NAUSEA: 0
DIARRHEA: 0
CHEST TIGHTNESS: 0
SHORTNESS OF BREATH: 0
ABDOMINAL PAIN: 0
COUGH: 0

## 2021-01-20 NOTE — PROGRESS NOTES
Nani Sweet is a 44 y.o. male who presents today for  Chief Complaint   Patient presents with    Arm Pain     elbow pain in both elbows       HPI:  He has had bilateral elbow pain for several months worse over the past 2 months. Pain is localized to lateral elbows, does not radiate. No swelling. Pain is exacerbated by bending elbows and trying to straighten them. Worse after doing upper body exercises such as pull-ups, etc.  He is not using any weights. He did back off on those exercises for several weeks and is now trying to start back. He does have some weakness in his arms at times mainly decreased  strength. Also notices some increased pain in elbows with twisting motions such as turning a doorknob. He has tried Tylenol and ibuprofen occasionally with no significant improvement. Has not tried ice. Had Synvisc injection to left knee recently per Dr. Cori Soriano. States injection was very painful, actually had a vasovagal syncopal episode afterwards. Pain in knee gradually improved after about a week. Now feels like he is getting better movement/range of motion. Review of Systems   Constitutional: Negative for chills and fever. HENT: Negative for congestion, ear pain and sore throat. Respiratory: Negative for cough, chest tightness, shortness of breath and wheezing. Cardiovascular: Negative for chest pain. Gastrointestinal: Negative for abdominal pain, diarrhea and nausea. Musculoskeletal: Positive for arthralgias (bilat elbows). Negative for myalgias. Skin: Negative for rash.        Past Medical History:   Diagnosis Date    Basal cell carcinoma     scalp, chest    Chronic lower back pain     CPAP (continuous positive airway pressure) dependence     YING (generalized anxiety disorder) 5/4/2020    Hyperlipidemia     RILEY (obstructive sleep apnea)     PTSD (post-traumatic stress disorder) 5/4/2020    Sciatica        Current Outpatient Medications Medication Sig Dispense Refill    zoster recombinant adjuvanted vaccine (SHINGRIX) 50 MCG/0.5ML SUSR injection Inject 0.5 mLs into the muscle See Admin Instructions 1 dose now and repeat in 2-6 months 0.5 mL 0    diclofenac sodium (VOLTAREN) 1 % GEL Apply 2 g topically 4 times daily as needed for Pain 100 g 1    atorvastatin (LIPITOR) 10 MG tablet Take 1 tablet by mouth daily 30 tablet 5    fluticasone (FLONASE) 50 MCG/ACT nasal spray SHAKE LIQUID AND USE 2 SPRAYS IN EACH NOSTRIL DAILY 16 g 3     No current facility-administered medications for this visit. Allergies   Allergen Reactions    Sulfa Antibiotics Hives       Past Surgical History:   Procedure Laterality Date    COLONOSCOPY N/A 12/10/2019    Dr Belkis Begum hemorrhoids-Grade 1, 10 yr recall    PRE-MALIGNANT / BENIGN SKIN LESION EXCISION      WISDOM TOOTH EXTRACTION         Social History     Tobacco Use    Smoking status: Never Smoker    Smokeless tobacco: Never Used   Substance Use Topics    Alcohol use: Yes     Alcohol/week: 7.0 standard drinks     Types: 7 Cans of beer per week    Drug use: Never       Family History   Problem Relation Age of Onset    COPD Mother    [facility] Cancer Mother         possible skin cancer    High Blood Pressure Father     Heart Disease Father     High Cholesterol Paternal Grandmother     High Cholesterol Paternal Grandfather     Heart Disease Paternal Grandfather        /84   Pulse 77   Temp 97.6 °F (36.4 °C) (Temporal)   Resp 18   Ht 5' 9\" (1.753 m)   Wt 171 lb (77.6 kg)   SpO2 98%   BMI 25.25 kg/m²     Physical Exam  Vitals signs reviewed. Constitutional:       General: He is not in acute distress. Appearance: Normal appearance. He is well-developed. HENT:      Head: Normocephalic. Eyes:      Conjunctiva/sclera: Conjunctivae normal.      Pupils: Pupils are equal, round, and reactive to light. Neck:      Musculoskeletal: Normal range of motion and neck supple. Thyroid: No thyromegaly. Vascular: No carotid bruit or JVD. Trachea: No tracheal deviation. Cardiovascular:      Rate and Rhythm: Normal rate and regular rhythm. Heart sounds: Normal heart sounds. No murmur. Pulmonary:      Effort: Pulmonary effort is normal. No respiratory distress. Breath sounds: Normal breath sounds. Musculoskeletal: Normal range of motion. General: Tenderness present. Comments: Moderate tenderness to lateral elbow bilaterally, no swelling. ROM intact. Lymphadenopathy:      Cervical: No cervical adenopathy. Skin:     General: Skin is warm and dry. Findings: No rash. Neurological:      Mental Status: He is alert. Psychiatric:         Mood and Affect: Mood normal.         Behavior: Behavior normal.         Thought Content: Thought content normal.         ASSESSMENT/PLAN:  1. Bilateral elbow tendonitis  -Symptoms likely secondary to tendinitis. He will try ice 15 to 20 minutes after exercising, up to 2-3 times a day. -Diclofenac gel, may use up to 4 times a day  -Elbow XR: Bilateral  -Discussed possible PT if not improving. May also defer to his orthopedic doctor if not improving. He will report any acute worsening. 2. Elbow pain, left    - XR ELBOW LEFT (MIN 3 VIEWS); Future    3. Elbow pain, right    - XR ELBOW RIGHT (MIN 3 VIEWS); Future    4. Need for vaccination  -Rx for shingrix given to pt  - zoster recombinant adjuvanted vaccine [facility]) 50 MCG/0.5ML SUSR injection; Inject 0.5 mLs into the muscle See Admin Instructions 1 dose now and repeat in 2-6 months  Dispense: 0.5 mL; Refill: 0         Return for as scheduled. Joe Guajardo was seen today for arm pain. Diagnoses and all orders for this visit:    Need for vaccination  -     zoster recombinant adjuvanted vaccine [facility]) 50 MCG/0.5ML SUSR injection;  Inject 0.5 mLs into the muscle See Admin Instructions 1 dose now and repeat in 2-6 months    Bilateral elbow tendonitis Elbow pain, left  -     XR ELBOW LEFT (MIN 3 VIEWS); Future    Elbow pain, right  -     XR ELBOW RIGHT (MIN 3 VIEWS); Future    Other orders  -     diclofenac sodium (VOLTAREN) 1 % GEL; Apply 2 g topically 4 times daily as needed for Pain      There are no discontinued medications. There are no Patient Instructions on file for this visit. Patient voicesunderstanding and agrees to plans along with risks and benefits of plan. Counseling:  Bruce Healy Chang's case, medications and options were discussed in detail. Patient was instructed to call the office if he questionsregarding him treatment. Should him conditions worsen, he should return to office to be reassessed by LUKE Mcneil. he Should to go the closest Emergency Department for any emergency. They verbalizedunderstanding the above instructions. Return for as scheduled.

## 2021-01-28 RX ORDER — FLUTICASONE PROPIONATE 50 MCG
SPRAY, SUSPENSION (ML) NASAL
Qty: 16 G | Refills: 3 | Status: SHIPPED | OUTPATIENT
Start: 2021-01-28 | End: 2021-04-28

## 2021-02-23 ENCOUNTER — OFFICE VISIT (OUTPATIENT)
Dept: PSYCHOLOGY | Age: 51
End: 2021-02-23
Payer: OTHER GOVERNMENT

## 2021-02-23 DIAGNOSIS — F41.1 GAD (GENERALIZED ANXIETY DISORDER): Primary | ICD-10-CM

## 2021-02-23 DIAGNOSIS — F43.10 PTSD (POST-TRAUMATIC STRESS DISORDER): ICD-10-CM

## 2021-02-23 DIAGNOSIS — F33.0 MILD EPISODE OF RECURRENT MAJOR DEPRESSIVE DISORDER (HCC): ICD-10-CM

## 2021-02-23 PROCEDURE — 90834 PSYTX W PT 45 MINUTES: CPT | Performed by: SOCIAL WORKER

## 2021-02-23 NOTE — PATIENT INSTRUCTIONS
Recommendations to patient:               5. Practice new coping, stress management, relaxation skills at least                   two times a day for at least 10-30 minutes.              2. Find at least one positive outlet per day that makes you feel better.              3. Talk things over with a good friend. Practice letting things go.              4. Stop, breathe, reset. \"I am ok. \"              3. I can find closure.               3. I can give my mind direction.              7. Stay in the moment.   [phone]. I can feel, when my body and my mind feels calmer.   [phone]. Put things up at night, compartmentalize.               10. I can take the foot off the accelerator.      Scheduled follow up appointment.     Call for a sooner appointment if needed or if you need to change or cancel you appointment.

## 2021-02-23 NOTE — PROGRESS NOTES
This note will not be viewable in [facility] for the following reason(s). This is a Psychotherapy Note. Behavioral Health Consultation  Lyly Hooper, [address] [facility] Consultant  2/23/2021  8:24 AM        Time spent with Patient: 45 minutes  This is patient's 11th  [facility] appointment.     Reason for Consult:          Chief Complaint   Patient presents with    Depression    Anxiety    Stress      Referring Provider: No referring provider defined for this encounter.        Feedback given to PCP.     S:  Patient reports problems with feeling stressed. Worked from home because of COVID and the snow. A lot of anxiety getting closer to snf. I want to make sure that everything is in order. The process is triggering. I had to stop writing things down. Tearful. I see the hands reaching out or the gap, over and over and I could not do anything. I am trying to compose myself. Tearful.      Addressed current and underlying issues, trauma and current affects on functioning, the changes because of the virus and associated stress, explored and released associated emotions, explored new ways to deal and cope with these problems.   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           O:  MSE:     Mood    Anxious  guilt  Depressed  Low self-esteem  [place]  Affect    anxiety, intense  Appetite Fair  Sleep disturbance Yes, nightmares  Fatigue Yes  Loss of pleasure Yes  Attention/Concentration    intact  Morbid ideation No  Suicide Assessment    no suicidal ideation           A:  Patient presents for consult due to problems with PTSD, depression, anxiety, chronic stressors stremming from exposure to stressful and traumatic events professionally and work related.        Continued consultation is clinically/medically necessary to support in learning new skills and build confidence to deal better with these issues. Patient response to consults, finds new strategies helpful.      Diagnosis:     1. PTSD (post-traumatic stress disorder)    2. YING (generalized anxiety disorder)    3. Mild episode of recurrent major depressive disorder (HCC)                   Diagnosis Date    Basal cell carcinoma       scalp, chest    Chronic lower back pain      CPAP (continuous positive airway pressure) dependence      Hyperlipidemia      RILEY (obstructive sleep apnea)      Sciatica              Plan:  Pt interventions:  Trained in strategies for increasing balanced thinking, Discussed self-care (sleep, nutrition, rewarding activities, social support, exercise), Discussed use of imagery, distractions, relaxation, mood management, communication training, questioning unhelpful thinking, problem-solving, and behavioral activation to manage pain and Supportive techniques, Affirmation and Normalization. Education about effects of these kind of stressors.  CBT and supportive techniques.         Pt Behavioral Change Plan:  See Pt Instructions

## 2021-04-01 ENCOUNTER — HOSPITAL ENCOUNTER (OUTPATIENT)
Dept: GENERAL RADIOLOGY | Age: 51
Discharge: HOME | End: 2021-04-01
Payer: OTHER GOVERNMENT

## 2021-04-01 ENCOUNTER — OFFICE VISIT (OUTPATIENT)
Dept: PSYCHOLOGY | Age: 51
End: 2021-04-01
Payer: OTHER GOVERNMENT

## 2021-04-01 DIAGNOSIS — F41.1 GAD (GENERALIZED ANXIETY DISORDER): ICD-10-CM

## 2021-04-01 DIAGNOSIS — M25.521 ELBOW PAIN, RIGHT: ICD-10-CM

## 2021-04-01 DIAGNOSIS — F43.10 PTSD (POST-TRAUMATIC STRESS DISORDER): Primary | ICD-10-CM

## 2021-04-01 DIAGNOSIS — M25.522 ELBOW PAIN, LEFT: ICD-10-CM

## 2021-04-01 DIAGNOSIS — F33.0 MILD EPISODE OF RECURRENT MAJOR DEPRESSIVE DISORDER (HCC): ICD-10-CM

## 2021-04-01 PROCEDURE — 73070 X-RAY EXAM OF ELBOW: CPT

## 2021-04-01 PROCEDURE — 90834 PSYTX W PT 45 MINUTES: CPT | Performed by: SOCIAL WORKER

## 2021-04-01 NOTE — PATIENT INSTRUCTIONS
Recommendations to patient:               7. Practice new coping, stress management, relaxation skills at least                   two times a day for at least 10-30 minutes.              2. Find at least one positive outlet per day that makes you feel better.              3. Talk things over with a good friend. Practice letting things go.              4. Stop, breathe, reset. \"I am ok. \"              1. I can find closure.               3. I can give my mind direction.              7. Stay in the moment.   [phone]. I can feel, when my body and my mind feels calmer.   [phone]. Put things up at night, compartmentalize.               10. I can take the foot off the accelerator.      Scheduled follow up appointment.     Call for a sooner appointment if needed or if you need to change or cancel you appointment. Nightmare Exposure and Rescripting    Exposure and rescripting are techniques that can help you to regain control over  nightmares and bad dreams. Exposure  We can make situations less fearful by confronting our fears. Imagine your nightmare is a movie script, that it has a beginning, middle, and end. Write down the story of what happens in your nightmare and read it through often. You can write it in words, or draw it out like a comic strip. Rescripting  We cant change events that have happened in our lives, but we can change the stories  we tell about them. Nightmares are just a story about something that has happened,  and our minds play that story at night as if its a video. If we change the story in important and memorable ways we can encourage our minds to play a dierent video. Follow these instructions for your nightmares. You may need do it multiple times if  there are lots of important moments. Once you have rescripted your nightmare it is  important to rehearse the new version so your mind will remember it while asleep.       Identify the worst moment of your nightmare  Where are you? What are you aware of? What is happening? What emotions are you feeling at that worst moment? Identify your emotions and what you feel in your body. Either during the nightmare or on waking    What would you prefer to feel in that moment? How would the story need to change for you to feel that way? Its your story, youre only limited by your imagination.  The more creative, imaginative, or funny, the the changes that you make, the better - anything that makes your new story stand out will make it more memorable

## 2021-04-02 DIAGNOSIS — M25.522 ELBOW PAIN, LEFT: Primary | ICD-10-CM

## 2021-04-02 DIAGNOSIS — M25.521 ELBOW PAIN, RIGHT: ICD-10-CM

## 2021-04-22 DIAGNOSIS — D72.819 LEUKOPENIA, UNSPECIFIED TYPE: Primary | ICD-10-CM

## 2021-04-26 ENCOUNTER — HOSPITAL ENCOUNTER (OUTPATIENT)
Dept: INFUSION THERAPY | Age: 51
Discharge: HOME | End: 2021-04-26
Payer: OTHER GOVERNMENT

## 2021-04-26 ENCOUNTER — OFFICE VISIT (OUTPATIENT)
Dept: HEMATOLOGY | Age: 51
End: 2021-04-26
Payer: OTHER GOVERNMENT

## 2021-04-26 VITALS
HEART RATE: 65 BPM | TEMPERATURE: 98 F | HEIGHT: 69 IN | SYSTOLIC BLOOD PRESSURE: 126 MMHG | BODY MASS INDEX: 24.76 KG/M2 | DIASTOLIC BLOOD PRESSURE: 70 MMHG | OXYGEN SATURATION: 99 % | WEIGHT: 167.2 LBS

## 2021-04-26 DIAGNOSIS — D72.819 LEUKOPENIA, UNSPECIFIED TYPE: Primary | ICD-10-CM

## 2021-04-26 DIAGNOSIS — D72.819 LEUKOPENIA, UNSPECIFIED TYPE: ICD-10-CM

## 2021-04-26 LAB
BASOPHILS ABSOLUTE: 0.02 K/UL (ref 0.01–0.08)
BASOPHILS RELATIVE PERCENT: 0.5 % (ref 0.1–1.2)
EOSINOPHILS ABSOLUTE: 0.05 K/UL (ref 0.04–0.54)
EOSINOPHILS RELATIVE PERCENT: 1.3 % (ref 0.7–7)
HCT VFR BLD CALC: 49.3 % (ref 40.1–51)
HEMOGLOBIN: 15.9 G/DL (ref 13.7–17.5)
LACTATE DEHYDROGENASE: 321 U/L (ref 313–618)
LYMPHOCYTES ABSOLUTE: 1.43 K/UL (ref 1.18–3.74)
LYMPHOCYTES RELATIVE PERCENT: 36.4 % (ref 19.3–53.1)
MCH RBC QN AUTO: 29.6 PG (ref 25.7–32.2)
MCHC RBC AUTO-ENTMCNC: 32.3 G/DL (ref 32.3–36.5)
MCV RBC AUTO: 91.6 FL (ref 79–92.2)
MONOCYTES ABSOLUTE: 0.57 K/UL (ref 0.24–0.82)
MONOCYTES RELATIVE PERCENT: 14.5 % (ref 4.7–12.5)
NEUTROPHILS ABSOLUTE: 1.86 K/UL (ref 1.56–6.13)
NEUTROPHILS RELATIVE PERCENT: 47.3 % (ref 34–71.1)
PDW BLD-RTO: 13.3 % (ref 11.6–14.4)
PLATELET # BLD: 178 K/UL (ref 163–337)
PMV BLD AUTO: 10.5 FL (ref 7.4–10.4)
RBC # BLD: 5.38 M/UL (ref 4.63–6.08)
WBC # BLD: 3.93 K/UL (ref 4.23–9.07)

## 2021-04-26 PROCEDURE — 99213 OFFICE O/P EST LOW 20 MIN: CPT | Performed by: NURSE PRACTITIONER

## 2021-04-26 PROCEDURE — 85025 COMPLETE CBC W/AUTO DIFF WBC: CPT

## 2021-04-26 PROCEDURE — 83615 LACTATE (LD) (LDH) ENZYME: CPT

## 2021-04-26 PROCEDURE — 99212 OFFICE O/P EST SF 10 MIN: CPT

## 2021-04-26 NOTE — PROGRESS NOTES
OP HEMATOLOGY/ONCOLOGY PROGRESS NOTE      Pt Name: Ileana Albrecht  YOB: 1970  MRN: [id number]  Referring provider: LUKE River  PCP: LUKE Monroy  Date of evaluation: 4/26/2021    History Obtained From:  patient, electronic medical record    CHIEF COMPLAINT:    Chief Complaint   Patient presents with    Follow-up     Leukopenia, unspecified type     HISTORY OF PRESENT ILLNESS:    Dalton Balling" Melinda Bueno is a 44 y.o.  male returning to the clinic to review serology completed for leukopenia. Clemente Sullivan denies infections, hospitalizations or changes in health history since last evaluated. CBC today, 4/26/2021, WBC is 3.93 and normal differentials including an ANC of 1.86. Hgb 15.9, MCV 91.6, platelets 362,213. Clemente Sullivan denies new complaints aside from arthritis. Clemente Sullivan states he will be retiring from the [employer] and will be having a skilled nursing physical.  He reviewed old medical records and reports having both negative HIV testing as well as hepatitis testing in the past.  He is without new concern at this time. HEMATOLOGY HISTORY: Leukopenia  Dalton Balling: Melinda Bueno was seen in initial hematology consultation 11/17/2020, referred by LUKE Monroy for evaluation of leukopenia. PMH significant for PTSD, YING, osteoarthritis, RILEY and basal cell carcinoma of the scalp (2016 in [facility]), followed by Dr. Fernando Fabian. Clemente Sullivan was seen by LUKE Monroy 11/4/2020 for annual exam.  He was noted to have a persistently low WBC for the past 2 years. CBC trend:      CBC 11/17/2020 at initial hematology consultation: WBC 3.75, Hgb/HCT 17.1/52.1, platelets 252,718. Differentials normal.    Clemente Sullivan denied complaint. He feels well, stable on current medications. He has no B symptoms. His only complaint was of arthritis. 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   He takes atorvastatin for history of hyperlipidemia. Denver Keith denies family history of blood disorders or cancers. Denver Keith brought a copy of past medical records from the [employer] with a summary of WBC counts ranging from 2.9-5.09, dating to 12. HIV testing was negative in 2016. He has received hepatitis vaccine and reports hepatitis testing in the past, but does not have a copy of this. He defers repeat testing at this time. Possible causes are discussed. Labs 2020:  · B12 level: 254 (MMA normal at 209 on 2021)  · Folate: 11.3  · LEIGHTON screen with reflex: 0.34  · Peripheral Blood Smear: Mild leukopenia. Negative for left shift. Negative for circulating blasts. Negative for inclusions. · [facility] spleen 2020 at [facility]: normal spleen size of 8.2 x 7.4 x 5.1 cm    Labs 2020:  · MMA level: 209, normal  · Sed rate: 2  · SPEP: No M-spike  · Immunofixation: No monoclonality  · Ig, IgA 150, IgM 41  · Copper: 89    WBC was stable at 3.93 with ANC 1.86 on 2021. Likely benign. Consider zinc level. Discussed bone marrow aspirate and biopsy. Hung declines and prefers to monitor peripherally. He will continue care through [facility] and return to this clinic on an as-needed basis. Colonoscopy is up-to-date last completed 12/10/2019, negative according to patient.     Past Medical History:   Diagnosis Date    Basal cell carcinoma     scalp, chest    Chronic lower back pain     CPAP (continuous positive airway pressure) dependence     YING (generalized anxiety disorder) 2020    Hyperlipidemia     RILEY (obstructive sleep apnea)     PTSD (post-traumatic stress disorder) 2020    Sciatica      Past Surgical History:   Procedure Laterality Date    COLONOSCOPY N/A 12/10/2019    Dr Stacey Peter hemorrhoids-Grade 1, 10 yr recall    PRE-MALIGNANT / BENIGN SKIN LESION EXCISION      WISDOM TOOTH EXTRACTION         Current Outpatient Medications:     diclofenac sodium (VOLTAREN) 1 % GEL, Apply 2 g shortness of breath and wheezing. Cardiovascular: Negative for chest pain, palpitations and leg swelling. Gastrointestinal: Negative for abdominal pain, constipation, diarrhea, nausea and vomiting. Endocrine: Negative for cold intolerance and heat intolerance. Genitourinary: Negative for dysuria, frequency, hematuria and urgency. Musculoskeletal: Negative for arthralgias, joint swelling and myalgias. Skin: Negative for pallor and rash. Allergic/Immunologic: Negative for environmental allergies and immunocompromised state. Neurological: Negative for seizures, syncope and numbness. Hematological: Negative for adenopathy. Does not bruise/bleed easily. Psychiatric/Behavioral: Negative for agitation, behavioral problems, confusion and suicidal ideas. The patient is not nervous/anxious. Objective   Physical Exam  Vitals signs reviewed. Constitutional:       General: He is not in acute distress. Appearance: He is well-developed. He is not toxic-appearing or diaphoretic. Comments: Wearing a facial mask   HENT:      Head: Normocephalic and atraumatic. Right Ear: External ear normal.      Left Ear: External ear normal.      Nose: Nose normal.      Mouth/Throat:      Mouth: Mucous membranes are moist.   Eyes:      General: No scleral icterus. Right eye: No discharge. Left eye: No discharge. Conjunctiva/sclera: Conjunctivae normal.   Neck:      Musculoskeletal: Neck supple. Trachea: No tracheal deviation. Cardiovascular:      Rate and Rhythm: Normal rate and regular rhythm. Pulmonary:      Effort: Pulmonary effort is normal. No respiratory distress. Breath sounds: Normal breath sounds. No wheezing or rales. Abdominal:      General: Bowel sounds are normal. There is no distension. Palpations: Abdomen is soft. Tenderness: There is no abdominal tenderness. There is no guarding.    Genitourinary:     Comments: Exam deferred  Musculoskeletal: General: No tenderness or deformity. Comments: Normal ROM all four extremities   Lymphadenopathy:      Cervical: No cervical adenopathy. Right cervical: No superficial cervical adenopathy. Left cervical: No superficial cervical adenopathy. Upper Body:      Right upper body: No supraclavicular adenopathy. Left upper body: No supraclavicular adenopathy. Comments: No bulky palpable cervical, clavicular, axillary or inguinal adenopathies on the left or right. Skin:     General: Skin is warm and dry. Findings: No rash. Neurological:      Mental Status: He is alert and oriented to person, place, and time. Comments: follows commands, non-focal   Psychiatric:         Behavior: Behavior normal. Behavior is cooperative. Thought Content: Thought content normal.         Judgment: Judgment normal.      Comments: Alert and oriented to person, place and time. /70   Pulse 65   Temp 98 °F (36.7 °C)   Ht 5' 9\" (1.753 m)   Wt 167 lb 3.2 oz (75.8 kg)   SpO2 99%   BMI 24.69 kg/m²   Wt Readings from Last 3 Encounters:   21 167 lb 3.2 oz (75.8 kg)   21 171 lb (77.6 kg)   20 170 lb 8 oz (77.3 kg)     ASSESSMENT/PLAN:    1. Leukopenia, unspecified type       Molly Santana continues oral B12 supplementation. CBC 21:   Lab Results   Component Value Date    WBC 3.93 (L) 2021    HGB 15.9 2021    HCT 49.3 2021    MCV 91.6 2021     2021   ANC: 1.86    Labs 2020:  · MMA level: 209, normal  · Sed rate: 2  · SPEP: No M-spike  · Immunofixation: No monoclonality  · Ig, IgA 150, IgM 41  · Copper: 89    Monitor CBC peripherally, likely benign. Consider zinc level. Discussed bone marrow aspirate and biopsy. Hung declines and prefers to monitor peripherally. He will continue care through [place] and return to this clinic on an as-needed basis. Treatment for arthritis per PCP.   Molly Santana is followed by dermatologist, Dr. aFhad Ferrari regarding history of basal cell carcinoma of the scalp. Return if symptoms worsen or fail to improve, for follow up with LUKE Ortiz. Sophie ARCE Friday am scribing for LUKE Kennedy. Electronically signed by Sophie Friday on 4/26/2021 at 8:05 am     Minerva ARCE APRN, personally performed the services described in this documentation as scribed by Bianca Desouza RN in my presence and it is both accurate and complete.           LUKE Kennedy  4:46 PM  4/29/2021

## 2021-04-27 ASSESSMENT — ENCOUNTER SYMPTOMS
SHORTNESS OF BREATH: 0
SORE THROAT: 0
EYE ITCHING: 0
NAUSEA: 0
ABDOMINAL PAIN: 0
TROUBLE SWALLOWING: 0
VOMITING: 0
COUGH: 0
DIARRHEA: 0
CONSTIPATION: 0
WHEEZING: 0
EYE DISCHARGE: 0

## 2021-04-28 RX ORDER — ATORVASTATIN CALCIUM 10 MG/1
10 TABLET, FILM COATED ORAL DAILY
Qty: 90 TABLET | Refills: 1 | Status: SHIPPED | OUTPATIENT
Start: 2021-04-28 | End: 2021-10-28

## 2021-04-28 RX ORDER — FLUTICASONE PROPIONATE 50 MCG
SPRAY, SUSPENSION (ML) NASAL
Qty: 48 G | Refills: 1 | Status: SHIPPED | OUTPATIENT
Start: 2021-04-28 | End: 2021-10-28

## 2021-05-04 ENCOUNTER — OFFICE VISIT (OUTPATIENT)
Dept: PSYCHOLOGY | Age: 51
End: 2021-05-04
Payer: OTHER GOVERNMENT

## 2021-05-04 DIAGNOSIS — F43.10 PTSD (POST-TRAUMATIC STRESS DISORDER): Primary | ICD-10-CM

## 2021-05-04 DIAGNOSIS — F33.0 MILD EPISODE OF RECURRENT MAJOR DEPRESSIVE DISORDER (HCC): ICD-10-CM

## 2021-05-04 DIAGNOSIS — F41.1 GAD (GENERALIZED ANXIETY DISORDER): ICD-10-CM

## 2021-05-04 PROCEDURE — 90834 PSYTX W PT 45 MINUTES: CPT | Performed by: SOCIAL WORKER

## 2021-05-04 NOTE — PATIENT INSTRUCTIONS
Recommendations to patient:               7. Practice new coping, stress management, relaxation skills at least                   two times a day for at least 10-30 minutes.              2. Find at least one positive outlet per day that makes you feel better.              3. Talk things over with a good friend. Practice letting things go.              4. Stop, breathe, reset. \"I am ok. \"              4. I can find closure.               5. I can give my mind direction.              7. Stay in the moment.   [phone]. I can feel, when my body and my mind feels calmer.   [phone]. Put things up at night, compartmentalize.               10. I can take the foot off the accelerator.      Scheduled follow up appointment.     Call for a sooner appointment if needed or if you need to change or cancel you appointment.

## 2021-05-04 NOTE — PROGRESS NOTES
This note will not be viewable in [employer]t for the following reason(s). This is a Psychotherapy Note. Behavioral Health Consultation  Ramila Mann, [address] [place] [address] Consultant  5/4/2021  8:25 AM      Time spent with Patient: 45 minutes  This is patient's 13th  [facility] appointment.     Reason for Consult:          Chief Complaint   Patient presents with    Depression    Anxiety    Stress      Referring Provider: No referring provider defined for this encounter.        Feedback given to PCP.     S:  Patient reports problems with feeling stressed, overwhelmed, there has been a lot going on with this process, had to stop several times, breathe, take a break, feel like I lose control over it. The paperwork for detention bring things up. I am still trying to digest all this.        Addressed current and underlying issues, trauma and current affects on functioning, the changes because of the virus and associated stress, explored and released associated emotions, explored new ways to deal and cope with these problems.   Explored triggers, releasing, and coping with traumatic events wile working for the [employer] cause stuttering. Some things are working out. The renters are moving. We were able to refinance.  The other house was a big weight, it was on the market and sold quick.           O:  MSE:     Mood    Anxious, stuttering some  guilt  Depressed  Low self-esteem  [place]  Affect    anxiety, intense  Appetite Fair  Sleep disturbance Yes, nightmares  Fatigue Yes  Loss of pleasure Yes  Attention/Concentration    intact  Morbid ideation No  Suicide Assessment    no suicidal ideation           A:  Patient presents for consult due to problems with PTSD, depression, anxiety, chronic stressors stremming from exposure to stressful and traumatic events professionally and work related.        Continued consultation is clinically/medically necessary to support in learning new skills and build confidence to deal better with these issues. Patient response to consults, finds new strategies helpful.      Diagnosis:     1. PTSD (post-traumatic stress disorder)    2. YING (generalized anxiety disorder)    3. Mild episode of recurrent major depressive disorder (HCC)                   Diagnosis Date    Basal cell carcinoma       scalp, chest    Chronic lower back pain      CPAP (continuous positive airway pressure) dependence      Hyperlipidemia      RILEY (obstructive sleep apnea)      Sciatica              Plan:  Pt interventions:  Trained in strategies for increasing balanced thinking, Discussed self-care (sleep, nutrition, rewarding activities, social support, exercise), Discussed use of imagery, distractions, relaxation, mood management, communication training, questioning unhelpful thinking, problem-solving, and behavioral activation to manage pain and Supportive techniques, Affirmation and Normalization. Education about effects of these kind of stressors.  CBT and supportive techniques.         Pt Behavioral Change Plan:  See Pt Instructions

## 2021-05-05 ENCOUNTER — OFFICE VISIT (OUTPATIENT)
Dept: FAMILY MEDICINE CLINIC | Age: 51
End: 2021-05-05
Payer: OTHER GOVERNMENT

## 2021-05-05 VITALS
TEMPERATURE: 97 F | BODY MASS INDEX: 24.29 KG/M2 | WEIGHT: 164 LBS | HEIGHT: 69 IN | HEART RATE: 72 BPM | SYSTOLIC BLOOD PRESSURE: 132 MMHG | DIASTOLIC BLOOD PRESSURE: 86 MMHG | OXYGEN SATURATION: 98 % | RESPIRATION RATE: 18 BRPM

## 2021-05-05 DIAGNOSIS — D72.819 LEUKOPENIA, UNSPECIFIED TYPE: ICD-10-CM

## 2021-05-05 DIAGNOSIS — F41.1 GAD (GENERALIZED ANXIETY DISORDER): Primary | ICD-10-CM

## 2021-05-05 DIAGNOSIS — G43.709 CHRONIC MIGRAINE WITHOUT AURA WITHOUT STATUS MIGRAINOSUS, NOT INTRACTABLE: ICD-10-CM

## 2021-05-05 DIAGNOSIS — M19.021: ICD-10-CM

## 2021-05-05 DIAGNOSIS — M19.022: ICD-10-CM

## 2021-05-05 PROCEDURE — 99214 OFFICE O/P EST MOD 30 MIN: CPT | Performed by: NURSE PRACTITIONER

## 2021-05-05 RX ORDER — TOPIRAMATE 25 MG/1
25 TABLET ORAL 2 TIMES DAILY
Qty: 60 TABLET | Refills: 5 | Status: SHIPPED | OUTPATIENT
Start: 2021-05-05 | End: 2021-10-28

## 2021-05-05 RX ORDER — RIZATRIPTAN BENZOATE 10 MG/1
10 TABLET ORAL
Qty: 10 TABLET | Refills: 2 | Status: SHIPPED | OUTPATIENT
Start: 2021-05-05 | End: 2021-06-16

## 2021-05-05 ASSESSMENT — ENCOUNTER SYMPTOMS
SORE THROAT: 0
SHORTNESS OF BREATH: 0
COUGH: 0
ABDOMINAL PAIN: 0
CHEST TIGHTNESS: 0
DIARRHEA: 0
NAUSEA: 1
WHEEZING: 0

## 2021-05-05 NOTE — PROGRESS NOTES
Mina Hamilton is a 44 y.o. male who presents today for  Chief Complaint   Patient presents with    Follow-up       HPI:  He has a history of recurrent migraines in the remote past.  Occurred for several years but gradually improved. He has had increased headaches for the past 6 months. Pain is diffuse and throbbing. Aleve helps if he catches it in time. May last about 4 hours if he takes aleve. Otherwise lasts all day into the following day. He typically has to lie down and rest.  He has not taken prophylactic medication in the past.  Has not taken triptans in the past.  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 Trying to see house as well. YING stable, controlled without medication. Followed by Darleen Hylton for counseling. He was seen by hematology for chronic leukopenia. Lab w/u has been normal including sed rate, spep, LDH, MMA.  WBC count stable, felt to likely be benign. Discussed bone marrow bx but he declined for now. They will see him prn. Bilateral elbow pain, chronic. He was seen at  by Dr. Vini Ceballos on 4/21 and had injections to both elbows with some relief. Elbow XR showed advanced degenerative changes. Discussed MRI if not improving. Review of Systems   Constitutional: Negative for chills and fever. HENT: Negative for congestion, ear pain and sore throat. Eyes: Negative for visual disturbance. Respiratory: Negative for cough, chest tightness, shortness of breath and wheezing. Cardiovascular: Negative for chest pain. Gastrointestinal: Positive for nausea (with migraines). Negative for abdominal pain and diarrhea. Musculoskeletal: Positive for arthralgias (elbows, chronic, stable). Negative for myalgias. Skin: Negative for rash. Neurological: Positive for headaches. Negative for dizziness and light-headedness. Psychiatric/Behavioral: The patient is not nervous/anxious (stable, controlled).         Past Medical History: Diagnosis Date    Basal cell carcinoma     scalp, chest    Chronic lower back pain     CPAP (continuous positive airway pressure) dependence     YING (generalized anxiety disorder) 5/4/2020    Hyperlipidemia     RILEY (obstructive sleep apnea)     PTSD (post-traumatic stress disorder) 5/4/2020    Sciatica        Current Outpatient Medications   Medication Sig Dispense Refill    topiramate (TOPAMAX) 25 MG tablet Take 1 tablet by mouth 2 times daily 60 tablet 5    rizatriptan (MAXALT) 10 MG tablet Take 1 tablet by mouth once as needed for Migraine May repeat in 2 hours if needed. Do not exceed 2 tablets in a 24 hour period. 10 tablet 2    atorvastatin (LIPITOR) 10 MG tablet TAKE 1 TABLET BY MOUTH DAILY 90 tablet 1    fluticasone (FLONASE) 50 MCG/ACT nasal spray SHAKE LIQUID AND USE 2 SPRAYS IN EACH NOSTRIL DAILY 48 g 1    diclofenac sodium (VOLTAREN) 1 % GEL Apply 2 g topically 4 times daily as needed for Pain 100 g 1    zoster recombinant adjuvanted vaccine (SHINGRIX) 50 MCG/0.5ML SUSR injection Inject 0.5 mLs into the muscle See Admin Instructions 1 dose now and repeat in 2-6 months 0.5 mL 0     No current facility-administered medications for this visit. Allergies   Allergen Reactions    Sulfa Antibiotics Hives       Past Surgical History:   Procedure Laterality Date    COLONOSCOPY N/A 12/10/2019    Dr Aquilla Boast hemorrhoids-Grade 1, 10 yr recall    PRE-MALIGNANT / BENIGN SKIN LESION EXCISION      WISDOM TOOTH EXTRACTION         Social History     Tobacco Use    Smoking status: Never Smoker    Smokeless tobacco: Never Used   Substance Use Topics    Alcohol use:  Yes     Alcohol/week: 7.0 standard drinks     Types: 7 Cans of beer per week    Drug use: Never       Family History   Problem Relation Age of Onset    COPD Mother     Cancer Mother         possible skin cancer    High Blood Pressure Father     Heart Disease Father     High Cholesterol Paternal Grandmother    Justin Aceves osteoarthritis of both elbows  -Stable, continue with recommendations per ortho. Return in about 6 weeks (around 6/16/2021) for follow up, 30 minute visit. Aliyah Ashton was seen today for follow-up. Diagnoses and all orders for this visit:    YING (generalized anxiety disorder)    Chronic migraine without aura without status migrainosus, not intractable    Leukopenia, unspecified type    Localized osteoarthritis of both elbows    Other orders  -     topiramate (TOPAMAX) 25 MG tablet; Take 1 tablet by mouth 2 times daily  -     rizatriptan (MAXALT) 10 MG tablet; Take 1 tablet by mouth once as needed for Migraine May repeat in 2 hours if needed. Do not exceed 2 tablets in a 24 hour period. There are no discontinued medications. Patient Instructions   topiramate - start once daily at bedtime x 7 days, then increase to twice daily      Patient voicesunderstanding and agrees to plans along with risks and benefits of plan. Counseling:  Aliyah Ashton Belt's case, medications and options were discussed in detail. Patient was instructed to call the office if he questionsregarding him treatment. Should him conditions worsen, he should return to office to be reassessed by LUKE Arechiga. he Should to go the closest Emergency Department for any emergency. They verbalizedunderstanding the above instructions. Return in about 6 weeks (around 6/16/2021) for follow up, 30 minute visit.

## 2021-06-08 ENCOUNTER — OFFICE VISIT (OUTPATIENT)
Dept: PSYCHOLOGY | Age: 51
End: 2021-06-08
Payer: OTHER GOVERNMENT

## 2021-06-08 DIAGNOSIS — F33.0 MILD EPISODE OF RECURRENT MAJOR DEPRESSIVE DISORDER (HCC): ICD-10-CM

## 2021-06-08 DIAGNOSIS — F43.10 PTSD (POST-TRAUMATIC STRESS DISORDER): Primary | ICD-10-CM

## 2021-06-08 DIAGNOSIS — F41.1 GAD (GENERALIZED ANXIETY DISORDER): ICD-10-CM

## 2021-06-08 DIAGNOSIS — D72.819 LEUKOPENIA, UNSPECIFIED TYPE: ICD-10-CM

## 2021-06-08 DIAGNOSIS — E78.2 MIXED HYPERLIPIDEMIA: ICD-10-CM

## 2021-06-08 LAB
ALBUMIN SERPL-MCNC: 4.4 G/DL (ref 3.5–5.2)
ALP BLD-CCNC: 50 U/L (ref 40–130)
ALT SERPL-CCNC: 11 U/L (ref 5–41)
ANION GAP SERPL CALCULATED.3IONS-SCNC: 10 MMOL/L (ref 7–19)
AST SERPL-CCNC: 14 U/L (ref 5–40)
BASOPHILS ABSOLUTE: 0 K/UL (ref 0–0.2)
BASOPHILS RELATIVE PERCENT: 0.5 % (ref 0–1)
BILIRUB SERPL-MCNC: 0.3 MG/DL (ref 0.2–1.2)
BUN BLDV-MCNC: 16 MG/DL (ref 6–20)
CALCIUM SERPL-MCNC: 8.9 MG/DL (ref 8.6–10)
CHLORIDE BLD-SCNC: 107 MMOL/L (ref 98–111)
CHOLESTEROL, TOTAL: 173 MG/DL (ref 160–199)
CO2: 23 MMOL/L (ref 22–29)
CREAT SERPL-MCNC: 0.9 MG/DL (ref 0.5–1.2)
EOSINOPHILS ABSOLUTE: 0.1 K/UL (ref 0–0.6)
EOSINOPHILS RELATIVE PERCENT: 2.1 % (ref 0–5)
GFR AFRICAN AMERICAN: >59
GFR NON-AFRICAN AMERICAN: >60
GLUCOSE BLD-MCNC: 106 MG/DL (ref 74–109)
HCT VFR BLD CALC: 44.1 % (ref 42–52)
HDLC SERPL-MCNC: 61 MG/DL (ref 55–121)
HEMOGLOBIN: 14.8 G/DL (ref 14–18)
IMMATURE GRANULOCYTES #: 0 K/UL
LDL CHOLESTEROL CALCULATED: 99 MG/DL
LYMPHOCYTES ABSOLUTE: 1.3 K/UL (ref 1.1–4.5)
LYMPHOCYTES RELATIVE PERCENT: 33.2 % (ref 20–40)
MCH RBC QN AUTO: 30.1 PG (ref 27–31)
MCHC RBC AUTO-ENTMCNC: 33.6 G/DL (ref 33–37)
MCV RBC AUTO: 89.8 FL (ref 80–94)
MONOCYTES ABSOLUTE: 0.4 K/UL (ref 0–0.9)
MONOCYTES RELATIVE PERCENT: 11.3 % (ref 0–10)
NEUTROPHILS ABSOLUTE: 2 K/UL (ref 1.5–7.5)
NEUTROPHILS RELATIVE PERCENT: 52.6 % (ref 50–65)
PDW BLD-RTO: 13.4 % (ref 11.5–14.5)
PLATELET # BLD: 251 K/UL (ref 130–400)
PMV BLD AUTO: 10.1 FL (ref 9.4–12.4)
POTASSIUM SERPL-SCNC: 4.1 MMOL/L (ref 3.5–5)
RBC # BLD: 4.91 M/UL (ref 4.7–6.1)
SODIUM BLD-SCNC: 140 MMOL/L (ref 136–145)
TOTAL PROTEIN: 6.9 G/DL (ref 6.6–8.7)
TRIGL SERPL-MCNC: 63 MG/DL (ref 0–149)
WBC # BLD: 3.8 K/UL (ref 4.8–10.8)

## 2021-06-08 PROCEDURE — 90834 PSYTX W PT 45 MINUTES: CPT | Performed by: SOCIAL WORKER

## 2021-06-08 NOTE — PATIENT INSTRUCTIONS
Recommendations to patient:               1. Practice new coping, stress management, relaxation skills at least                   two times a day for at least 10-30 minutes.              2. Find at least one positive outlet per day that makes you feel better.              3. Talk things over with a good friend. Practice letting things go.              4. Stop, breathe, reset. \"I am ok. \"              4. I can find closure.               2. I can give my mind direction.              7. Stay in the moment.   [phone]. I can feel, when my body and my mind feels calmer.   [phone]. Put things up at night, compartmentalize.               10. I can take the foot off the accelerator.      Scheduled follow up appointment.     Call for a sooner appointment if needed or if you need to change or cancel you appointment.

## 2021-06-08 NOTE — PROGRESS NOTES
Behavioral Health Consultation  Garth Cheung, [address] Consultant  6/8/2021  9:27 AM        Time spent with Patient: 45 minutes  This is patient's 14th  [facility] appointment.     Reason for Consult:          Chief Complaint   Patient presents with    Depression    Anxiety    Stress      Referring Provider: No referring provider defined for this encounter.        Feedback given to PCP.     S:  Patient reports problems with feeling stressed, overwhelmed. 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    The machine for my eye appointment sounded like the auxiliary unit of the aircraft and it triggered a response and all the pictures. I identified that it was an issue. I can find myself hyperventilating. Constant monitoring so that you don't get to that point where you cannot breathe. I am a mess. Physically and emotionally. Tearful.      Addressed current and underlying issues, trauma and current affects on functioning, the changes because of the virus and associated stress, explored and released associated emotions, explored new ways to deal and cope with these problems.   Explored triggers, releasing, and coping with traumatic events allen working for the [employer] cause stuttering.     Serving four years in [employer], it was hard.  We lived in a community, people calling us names, but it was good to experience different culture.         O:  MSE:     Mood    Anxious, stuttering some  guilt  Depressed  Low self-esteem  [place]  Affect    anxiety, intense  Appetite Fair  Sleep disturbance Yes, nightmares  Fatigue Yes  Loss of pleasure Yes  Attention/Concentration    intact  Morbid ideation No  Suicide Assessment    no suicidal ideation           A:  Patient presents for consult due to problems with PTSD, depression, anxiety, chronic stressors stremming from exposure to stressful and traumatic events professionally and work related.        Continued consultation is clinically/medically necessary to support in learning new skills and build confidence to deal better with these issues. Patient response to consults, finds new strategies helpful.      Diagnosis:     1. PTSD (post-traumatic stress disorder)    2. YING (generalized anxiety disorder)    3. Mild episode of recurrent major depressive disorder (HCC)                   Diagnosis Date    Basal cell carcinoma       scalp, chest    Chronic lower back pain      CPAP (continuous positive airway pressure) dependence      Hyperlipidemia      RILEY (obstructive sleep apnea)      Sciatica              Plan:  Pt interventions:  Trained in strategies for increasing balanced thinking, Discussed self-care (sleep, nutrition, rewarding activities, social support, exercise), Discussed use of imagery, distractions, relaxation, mood management, communication training, questioning unhelpful thinking, problem-solving, and behavioral activation to manage pain and Supportive techniques, Affirmation and Normalization. Education about effects of these kind of stressors.  CBT and supportive techniques.         Pt Behavioral Change Plan:  See Pt Instructions

## 2021-06-15 ENCOUNTER — PROCEDURE VISIT (OUTPATIENT)
Dept: ENT CLINIC | Age: 51
End: 2021-06-15
Payer: OTHER GOVERNMENT

## 2021-06-15 DIAGNOSIS — H93.13 TINNITUS OF BOTH EARS: ICD-10-CM

## 2021-06-15 DIAGNOSIS — H91.8X9 ASYMMETRICAL HEARING LOSS: Primary | ICD-10-CM

## 2021-06-15 PROCEDURE — 92552 PURE TONE AUDIOMETRY AIR: CPT | Performed by: AUDIOLOGIST

## 2021-06-15 NOTE — PROGRESS NOTES
History   Ileana Albrecht is a 44 y.o. male who presented to the clinic this date for an annual hearing evaluation due to left asymmetrical hearing loss. Summary   Pure tone testing indicates mild high frequency SNHL bilaterally with a left asymmetry. When compared to audiogram obtained in 2020, thresholds remained stable bilaterally. Results   Otoscopy:    Right: Non-Occluding Cerumen   Left: Non-Occluding Cerumen    Audiometry:    Right: Mild high frequency SNHL   Left: Mild high frequency SNHL         Plan   Results of today's testing were discussed with Mr. Melinda Bueno and the following recommendations were made:    1. Monitor hearing yearly, sooner with changes.         Audiogram and Acoustic Immittance

## 2021-06-16 ENCOUNTER — OFFICE VISIT (OUTPATIENT)
Dept: FAMILY MEDICINE CLINIC | Age: 51
End: 2021-06-16
Payer: OTHER GOVERNMENT

## 2021-06-16 VITALS
OXYGEN SATURATION: 95 % | RESPIRATION RATE: 18 BRPM | HEART RATE: 71 BPM | DIASTOLIC BLOOD PRESSURE: 80 MMHG | SYSTOLIC BLOOD PRESSURE: 126 MMHG | TEMPERATURE: 96.6 F | BODY MASS INDEX: 23.92 KG/M2 | WEIGHT: 162 LBS

## 2021-06-16 DIAGNOSIS — D72.819 CHRONIC LEUKOPENIA: ICD-10-CM

## 2021-06-16 DIAGNOSIS — E78.2 MIXED HYPERLIPIDEMIA: ICD-10-CM

## 2021-06-16 DIAGNOSIS — L85.3 DRY SKIN DERMATITIS: ICD-10-CM

## 2021-06-16 DIAGNOSIS — Z12.5 PROSTATE CANCER SCREENING: ICD-10-CM

## 2021-06-16 DIAGNOSIS — G43.709 CHRONIC MIGRAINE WITHOUT AURA WITHOUT STATUS MIGRAINOSUS, NOT INTRACTABLE: Primary | ICD-10-CM

## 2021-06-16 PROCEDURE — 99214 OFFICE O/P EST MOD 30 MIN: CPT | Performed by: NURSE PRACTITIONER

## 2021-06-16 ASSESSMENT — ENCOUNTER SYMPTOMS
ROS SKIN COMMENTS: DRYNESS
SHORTNESS OF BREATH: 0
SORE THROAT: 0
COUGH: 0
NAUSEA: 0
CHEST TIGHTNESS: 0
WHEEZING: 0
VOMITING: 0
ABDOMINAL PAIN: 0
DIARRHEA: 0

## 2021-06-16 NOTE — PROGRESS NOTES
Mina Hamilton is a 44 y.o. male who presents today for  Chief Complaint   Patient presents with    Follow-up       HPI:  His migraines have been better controlled since starting topiramate. He is tolerating well 25 mg twice daily. Initially he did have mild lightheadedness, felt off balance but this resolved. He was having about 1 migraine a week, this has decreased in frequency. He did have a significant migraine a couple of weeks ago that did not improve with Maxalt. Throbbing, pain across frontal region, nausea. No significant migraine since. Chronic leukopenia, has been followed by hematology with negative work-up on lab. They had discussed bone marrow biopsy but he declined at the time. He has had no night sweats, cough. He has had some mild weight loss over time. Colonoscopy 2019, normal.    He has had chronic dry flaky skin mainly to lower legs, also arms. Ongoing for years. He uses CeraVe lotion. [facility] was concerned about possible autoimmune disease in light of his leukopenia. His LEIGHTON was negative with hematology work-up. He is followed by Dr. Reena Stock with dermatology. Hyperlipidemia  Tolerating current cholesterol medication without side effects. No body aches. Attempting to reduce processed sugar and cholesterol from diet. Review of Systems   Constitutional: Negative for chills and fever. HENT: Negative for congestion, ear pain and sore throat. Respiratory: Negative for cough, chest tightness, shortness of breath and wheezing. Cardiovascular: Negative for chest pain. Gastrointestinal: Negative for abdominal pain, diarrhea, nausea and vomiting. Musculoskeletal: Negative for arthralgias and myalgias. Skin: Negative for rash. Dryness   Neurological: Positive for headaches (migraines have improved).        Past Medical History:   Diagnosis Date    Basal cell carcinoma     scalp, chest    Chronic lower back pain     CPAP (continuous positive airway pressure) dependence     YING (generalized anxiety disorder) 5/4/2020    Hyperlipidemia     RILEY (obstructive sleep apnea)     PTSD (post-traumatic stress disorder) 5/4/2020    Sciatica        Current Outpatient Medications   Medication Sig Dispense Refill    topiramate (TOPAMAX) 25 MG tablet Take 1 tablet by mouth 2 times daily 60 tablet 5    rizatriptan (MAXALT) 10 MG tablet Take 1 tablet by mouth once as needed for Migraine May repeat in 2 hours if needed. Do not exceed 2 tablets in a 24 hour period. 10 tablet 2    atorvastatin (LIPITOR) 10 MG tablet TAKE 1 TABLET BY MOUTH DAILY 90 tablet 1    fluticasone (FLONASE) 50 MCG/ACT nasal spray SHAKE LIQUID AND USE 2 SPRAYS IN EACH NOSTRIL DAILY 48 g 1    zoster recombinant adjuvanted vaccine (SHINGRIX) 50 MCG/0.5ML SUSR injection Inject 0.5 mLs into the muscle See Admin Instructions 1 dose now and repeat in 2-6 months 0.5 mL 0    diclofenac sodium (VOLTAREN) 1 % GEL Apply 2 g topically 4 times daily as needed for Pain 100 g 1     No current facility-administered medications for this visit. Allergies   Allergen Reactions    Sulfa Antibiotics Hives       Past Surgical History:   Procedure Laterality Date    COLONOSCOPY N/A 12/10/2019    Dr Sima Madrid hemorrhoids-Grade 1, 10 yr recall    PRE-MALIGNANT / BENIGN SKIN LESION EXCISION      WISDOM TOOTH EXTRACTION         Social History     Tobacco Use    Smoking status: Never Smoker    Smokeless tobacco: Never Used   Vaping Use    Vaping Use: Never used   Substance Use Topics    Alcohol use:  Yes     Alcohol/week: 7.0 standard drinks     Types: 7 Cans of beer per week    Drug use: Never       Family History   Problem Relation Age of Onset    COPD Mother     Cancer Mother         possible skin cancer    High Blood Pressure Father     Heart Disease Father     High Cholesterol Paternal Grandmother     High Cholesterol Paternal Grandfather     Heart Disease Paternal Grandfather        BP 126/80   Pulse 71   Temp 96.6 °F (35.9 °C)   Resp 18   Wt 162 lb (73.5 kg)   SpO2 95%   BMI 23.92 kg/m²     Physical Exam  Vitals reviewed. Constitutional:       General: He is not in acute distress. Appearance: Normal appearance. He is well-developed. HENT:      Head: Normocephalic. Eyes:      Conjunctiva/sclera: Conjunctivae normal.      Pupils: Pupils are equal, round, and reactive to light. Neck:      Thyroid: No thyromegaly. Vascular: No carotid bruit or JVD. Trachea: No tracheal deviation. Cardiovascular:      Rate and Rhythm: Normal rate and regular rhythm. Heart sounds: Normal heart sounds. No murmur heard. Pulmonary:      Effort: Pulmonary effort is normal. No respiratory distress. Breath sounds: Normal breath sounds. Musculoskeletal:         General: Normal range of motion. Cervical back: Normal range of motion and neck supple. Lymphadenopathy:      Cervical: No cervical adenopathy. Skin:     General: Skin is warm and dry. Findings: No rash. Comments: Dry flaky patches to lower legs, lesser to arms. No erythema or excoriation. Neurological:      General: No focal deficit present. Mental Status: He is alert. Psychiatric:         Mood and Affect: Mood normal.         Behavior: Behavior normal.         Thought Content: Thought content normal.         ASSESSMENT/PLAN:  1. Chronic migraine without aura without status migrainosus, not intractable  -Improved with topiramate, continue 25 mg twice daily. We did discuss increasing to 25/50 mg if headaches increase in frequency.  -Continue rizatriptan as needed for more severe headaches. -May take OTC analgesic such as Aleve, ibuprofen, Tylenol for headache as well.  -He will report any acute worsening or problems. 2. Chronic leukopenia  -Stable, continue to monitor.  -Refer back to hematology with any acute changes  -Check CBC with next lab  - CBC Auto Differential; Future    3.  Dry skin dermatitis  -Chronic issue, possible eczema variant. Advised that he make an appointment with dermatology to discuss. 4. Mixed hyperlipidemia  -Stable, controlled on atorvastatin. Continue current dose. -Continue to work on low-fat diet, exercise.  -Fasting CMP, lipid with next lab  - Comprehensive Metabolic Panel; Future  - Lipid Panel; Future    5. Prostate cancer screening  -Check screening PSA with next lab  - PSA screening; Future    F/U in Nov for physical with fasting labs. Return in about 5 months (around 11/16/2021) for physical, 30 minute visit. Anya Hernandez was seen today for follow-up. Diagnoses and all orders for this visit:    Chronic migraine without aura without status migrainosus, not intractable    Chronic leukopenia  -     CBC Auto Differential; Future    Dry skin dermatitis    Mixed hyperlipidemia  -     Comprehensive Metabolic Panel; Future  -     Lipid Panel; Future    Prostate cancer screening  -     PSA screening; Future      There are no discontinued medications. There are no Patient Instructions on file for this visit. Patient voicesunderstanding and agrees to plans along with risks and benefits of plan. Counseling:  Anya Hernandez Belt's case, medications and options were discussed in detail. Patient was instructed to call the office if he questionsregarding him treatment. Should him conditions worsen, he should return to office to be reassessed by LUKE Villela. he Should to go the closest Emergency Department for any emergency. They verbalizedunderstanding the above instructions. Return in about 5 months (around 11/16/2021) for physical, 30 minute visit.

## 2021-07-13 ENCOUNTER — OFFICE VISIT (OUTPATIENT)
Dept: PSYCHOLOGY | Age: 51
End: 2021-07-13
Payer: OTHER GOVERNMENT

## 2021-07-13 DIAGNOSIS — F43.10 PTSD (POST-TRAUMATIC STRESS DISORDER): Primary | ICD-10-CM

## 2021-07-13 DIAGNOSIS — F41.1 GAD (GENERALIZED ANXIETY DISORDER): ICD-10-CM

## 2021-07-13 DIAGNOSIS — F33.0 MILD EPISODE OF RECURRENT MAJOR DEPRESSIVE DISORDER (HCC): ICD-10-CM

## 2021-07-13 PROCEDURE — 90834 PSYTX W PT 45 MINUTES: CPT | Performed by: SOCIAL WORKER

## 2021-07-13 NOTE — PATIENT INSTRUCTIONS
Recommendations to patient:               9. Practice new coping, stress management, relaxation skills at least                   two times a day for at least 10-30 minutes.              2. Find at least one positive outlet per day that makes you feel better.              3. Talk things over with a good friend. Practice letting things go.              4. Stop, breathe, reset. \"I am ok. \"              3. I can find closure.               2. I can give my mind direction.              7. Stay in the moment.   [phone]. I can feel, when my body and my mind feels calmer.   [phone]. Put things up at night, compartmentalize.               10. I can take the foot off the accelerator.      Scheduled follow up appointment.     Call for a sooner appointment if needed or if you need to change or cancel you appointment.

## 2021-07-13 NOTE — PSYCHOTHERAPY
Behavioral Health Consultation  Juan Dempsey, [address] Consultant  7/13/2021  9:21 AM        Time spent with Patient: 45 minutes  This is patient's 15th  [facility] appointment.     Reason for Consult:          Chief Complaint   Patient presents with    Depression    Anxiety    Stress      Referring Provider: No referring provider defined for this encounter.        Feedback given to PCP.     S:  Patient reports problems with feeling stressed, overwhelmed. The appointment was very difficult, took two hours. He just wanted for me to keep going, I was bawling right from the beginning. I went through a lot of the cases. I was a mess, and sat in the car fora while before I could leave. The it took an hour and a half. Triggered when he brought this up immediately. I took my time to calm down afterward, walked in a park. Came back from trip, and took the weekend to recover. Airports were packed and stressful. Settling in to routine again. My wife is taking care of her mother is getting stressful.        Addressed current and underlying issues, trauma and current affects on functioning, the changes because of the virus and associated stress, explored and released associated emotions, explored new ways to deal and cope with these problems.   Explored triggers, releasing, and coping with traumatic events allen working for the [employer] cause stuttering.   The time with family, being in the camper was good. In the morning, I took a deep, refreshing breath and looking at the mountains.  Takes you away from everything, my wife and me.             O:  MSE:     Mood    Anxious, intensified as soon as he recollected about appoitment  Depressed  Low self-esteem  [place]  Affect    Full affect  Appetite Fair  Sleep disturbance Yes, nightmares  Fatigue Yes  Loss of pleasure Yes  Attention/Concentration    intact  Morbid ideation No  Suicide Assessment    no suicidal ideation           A:  Patient presents for consult due to problems with PTSD, depression, anxiety, chronic stressors stremming from exposure to stressful and traumatic events professionally and work related.        Continued consultation is clinically/medically necessary to support in learning new skills and build confidence to deal better with these issues. Patient response to consults, finds new strategies helpful.      Diagnosis:     1. PTSD (post-traumatic stress disorder)    2. YING (generalized anxiety disorder)    3. Mild episode of recurrent major depressive disorder (HCC)                   Diagnosis Date    Basal cell carcinoma       scalp, chest    Chronic lower back pain      CPAP (continuous positive airway pressure) dependence      Hyperlipidemia      RILEY (obstructive sleep apnea)      Sciatica              Plan:  Pt interventions:  Trained in strategies for increasing balanced thinking, Discussed self-care (sleep, nutrition, rewarding activities, social support, exercise), Discussed use of imagery, distractions, relaxation, mood management, communication training, questioning unhelpful thinking, problem-solving, and behavioral activation to manage pain and Supportive techniques, Affirmation and Normalization. Education about effects of these kind of stressors.  CBT and supportive techniques.         Pt Behavioral Change Plan:  See Pt Instructions

## 2021-08-18 ENCOUNTER — OFFICE VISIT (OUTPATIENT)
Dept: PSYCHOLOGY | Age: 51
End: 2021-08-18
Payer: OTHER GOVERNMENT

## 2021-08-18 DIAGNOSIS — F33.0 MILD EPISODE OF RECURRENT MAJOR DEPRESSIVE DISORDER (HCC): ICD-10-CM

## 2021-08-18 DIAGNOSIS — F41.1 GAD (GENERALIZED ANXIETY DISORDER): ICD-10-CM

## 2021-08-18 DIAGNOSIS — F43.10 PTSD (POST-TRAUMATIC STRESS DISORDER): Primary | ICD-10-CM

## 2021-08-18 PROCEDURE — 90834 PSYTX W PT 45 MINUTES: CPT | Performed by: SOCIAL WORKER

## 2021-08-18 NOTE — PATIENT INSTRUCTIONS
Recommendations to patient:               9. Practice new coping, stress management, relaxation skills at least                   two times a day for at least 10-30 minutes.              2. Find at least one positive outlet per day that makes you feel better.              3. Talk things over with a good friend. Practice letting things go.              4. Stop, breathe, reset. \"I am ok. \"              7. I can find closure.               4. I can give my mind direction.              7. Stay in the moment.   [phone]. I can feel, when my body and my mind feels calmer.   [phone]. Put things up at night, compartmentalize.               10. I can take the foot off the accelerator.      Scheduled follow up appointment.     Call for a sooner appointment if needed or if you need to change or cancel you appointment.

## 2021-08-18 NOTE — PSYCHOTHERAPY
Behavioral Health Consultation  Riley Taylor, [address] Consultant  8/18/2021  9:18 AM      Time spent with Patient: 45 minutes  This is patient's 16th  [facility] [place] appointment.     Reason for Consult:          Chief Complaint   Patient presents with    Depression    Anxiety    Stress      Referring Provider: No referring provider defined for this encounter.        Feedback given to PCP.     S:  Patient reports problems with feeling stressed, overwhelmed. I am not sure if long-term was the right decision. Postponed ceremony because of increase in COVID. It is important for me to have closure. I had some sleepless nights, in limbo right now.       Addressed current and underlying issues, trauma and current affects on functioning, the changes because of the virus and associated stress, explored and released associated emotions, explored new ways to deal and cope with these problems.   Explored triggers, releasing, and coping with traumatic events wile working for the [employer] cause stuttering.     It has been more pleasant, calm.         O:  MSE:     Mood    Anxious, intensified as soon as he recollected about appoitment  Depressed  Low self-esteem  [place]  Affect    Full affect  Appetite Fair  Sleep disturbance Yes,, have not had nightmares in last week  Fatigue Yes  Loss of pleasure Yes  Attention/Concentration    intact  Morbid ideation No  Suicide Assessment    no suicidal ideation           A:  Patient presents for consult due to problems with PTSD, depression, anxiety, chronic stressors stremming from exposure to stressful and traumatic events professionally and work related.        Continued consultation is clinically/medically necessary to support in learning new skills and build confidence to deal better with these issues. Patient response to consults, finds new strategies helpful.      Diagnosis:     1. PTSD (post-traumatic stress disorder)    2.  YING (generalized anxiety disorder)    3. Mild episode of recurrent major depressive disorder (HCC)                   Diagnosis Date    Basal cell carcinoma       scalp, chest    Chronic lower back pain      CPAP (continuous positive airway pressure) dependence      Hyperlipidemia      RILEY (obstructive sleep apnea)      Sciatica              Plan:  Pt interventions:  Trained in strategies for increasing balanced thinking, Discussed self-care (sleep, nutrition, rewarding activities, social support, exercise), Discussed use of imagery, distractions, relaxation, mood management, communication training, questioning unhelpful thinking, problem-solving, and behavioral activation to manage pain and Supportive techniques, Affirmation and Normalization. Education about effects of these kind of stressors.  CBT and supportive techniques.         Pt Behavioral Change Plan:  See Pt Instructions

## 2021-10-28 RX ORDER — TOPIRAMATE 25 MG/1
TABLET ORAL
Qty: 60 TABLET | Refills: 5 | Status: SHIPPED | OUTPATIENT
Start: 2021-10-28

## 2021-10-28 RX ORDER — ATORVASTATIN CALCIUM 10 MG/1
10 TABLET, FILM COATED ORAL DAILY
Qty: 90 TABLET | Refills: 1 | Status: SHIPPED | OUTPATIENT
Start: 2021-10-28 | End: 2022-04-27

## 2021-10-28 RX ORDER — FLUTICASONE PROPIONATE 50 MCG
SPRAY, SUSPENSION (ML) NASAL
Qty: 48 G | Refills: 1 | Status: SHIPPED | OUTPATIENT
Start: 2021-10-28 | End: 2022-04-27

## 2021-12-09 ENCOUNTER — OFFICE VISIT (OUTPATIENT)
Dept: INTERNAL MEDICINE | Facility: CLINIC | Age: 51
End: 2021-12-09

## 2021-12-09 ENCOUNTER — OFFICE VISIT (OUTPATIENT)
Dept: PSYCHIATRY | Age: 51
End: 2021-12-09
Payer: OTHER GOVERNMENT

## 2021-12-09 DIAGNOSIS — Z12.5 SCREENING FOR MALIGNANT NEOPLASM OF PROSTATE: ICD-10-CM

## 2021-12-09 DIAGNOSIS — F41.1 GAD (GENERALIZED ANXIETY DISORDER): ICD-10-CM

## 2021-12-09 DIAGNOSIS — M25.522 PAIN OF BOTH ELBOWS: ICD-10-CM

## 2021-12-09 DIAGNOSIS — M25.521 PAIN OF BOTH ELBOWS: ICD-10-CM

## 2021-12-09 DIAGNOSIS — G89.29 CHRONIC PAIN OF LEFT KNEE: ICD-10-CM

## 2021-12-09 DIAGNOSIS — F43.10 PTSD (POST-TRAUMATIC STRESS DISORDER): Primary | ICD-10-CM

## 2021-12-09 DIAGNOSIS — C44.91 BASAL CELL CARCINOMA (BCC), UNSPECIFIED SITE: ICD-10-CM

## 2021-12-09 DIAGNOSIS — M25.562 CHRONIC PAIN OF LEFT KNEE: ICD-10-CM

## 2021-12-09 DIAGNOSIS — F33.0 MDD (MAJOR DEPRESSIVE DISORDER), RECURRENT EPISODE, MILD (HCC): ICD-10-CM

## 2021-12-09 DIAGNOSIS — E78.2 MIXED HYPERLIPIDEMIA: Primary | ICD-10-CM

## 2021-12-09 DIAGNOSIS — D70.8 OTHER NEUTROPENIA (HCC): ICD-10-CM

## 2021-12-09 PROCEDURE — 90834 PSYTX W PT 45 MINUTES: CPT | Performed by: SOCIAL WORKER

## 2021-12-09 PROCEDURE — 99204 OFFICE O/P NEW MOD 45 MIN: CPT | Performed by: FAMILY MEDICINE

## 2021-12-09 RX ORDER — RIZATRIPTAN BENZOATE 10 MG/1
10 TABLET ORAL ONCE AS NEEDED
Qty: 10 TABLET | Refills: 3 | Status: SHIPPED | OUTPATIENT
Start: 2021-12-09

## 2021-12-09 RX ORDER — TOPIRAMATE 25 MG/1
25 TABLET ORAL 2 TIMES DAILY
Qty: 180 TABLET | Refills: 1 | Status: SHIPPED | OUTPATIENT
Start: 2021-12-09

## 2021-12-09 RX ORDER — RIZATRIPTAN BENZOATE 10 MG/1
10 TABLET ORAL ONCE AS NEEDED
COMMUNITY
End: 2021-12-09 | Stop reason: SDUPTHER

## 2021-12-09 RX ORDER — TOPIRAMATE 25 MG/1
1 TABLET ORAL 2 TIMES DAILY
COMMUNITY
Start: 2021-10-28 | End: 2021-12-09 | Stop reason: SDUPTHER

## 2021-12-09 RX ORDER — FLUTICASONE PROPIONATE 50 MCG
2 SPRAY, SUSPENSION (ML) NASAL DAILY
COMMUNITY
Start: 2021-10-28

## 2021-12-09 RX ORDER — ATORVASTATIN CALCIUM 10 MG/1
1 TABLET, FILM COATED ORAL DAILY
COMMUNITY
Start: 2021-10-28

## 2021-12-09 NOTE — PROGRESS NOTES
Subjective     Chief Complaint   Patient presents with   • Annual Exam     Establishing care.        History of Present Illness  Mr. Romero presents today for an annual exam.  He is a new patient to this practice.  Has recently retired from the [employer].  And they have moved to the house out by [employer].  The patient does have problems with his knee routinely he has been getting it injected with Synvisc.  Dr. Yoon here in town had been doing that.  However the last time he did that he had a very bad experience with how this shot was done and he would like to change to a different orthopedist for this.  He also follows with Dr. Arndt at orthopedic [place] for his shoulders.  He gets injections in these routinely.  He would like to follow with Dr. Arndt for his knees also.  Patient does need refills on his Maxalt and Topamax.  These have helped his headaches significantly.  He takes his atorvastatin routinely.  It has been a while since he has had lab work.  He tries to stay active and physically fit.  He does have an issue with having had basal cell carcinoma before.  He does follow routinely with podiatry for this he will need a new referral since he is changed insurances since his [facility].  Patient is also had a longstanding issue with neutropenia.  It comes and goes.  His lab work is monitored routinely.  Patient's PMR from outside medical facility reviewed and noted.    Review of Systems     Otherwise complete ROS reviewed and negative except as mentioned in the HPI.    Past Medical History:   Past Medical History:   Diagnosis Date   • Basal cell carcinoma    • Headache    • Hyperlipidemia    • PTSD (post-traumatic stress disorder)    • Sciatica    • Sleep apnea      Past Surgical History:  Past Surgical History:   Procedure Laterality Date   • COLONOSCOPY     • SKIN TAG REMOVAL     • WISDOM TOOTH EXTRACTION       Social History:  reports that he has never smoked. He has never used smokeless  "tobacco. He reports current alcohol use. He reports that he does not use drugs.    Family History: family history includes COPD in his mother; Heart disease in his father; Hyperlipidemia in his father; Hypertension in his father.       Allergies:  Allergies   Allergen Reactions   • Sulfa Antibiotics Rash     Medications:  Prior to Admission medications    Medication Sig Start Date End Date Taking? Authorizing Provider   atorvastatin (LIPITOR) 10 MG tablet Take 1 tablet by mouth Daily. 10/28/21  Yes Esther Cordoba MD   fluticasone (FLONASE) 50 MCG/ACT nasal spray 2 sprays by Each Nare route Daily. 10/28/21  Yes Esther Cordoba MD   rizatriptan (MAXALT) 10 MG tablet Take 10 mg by mouth 1 (One) Time As Needed for Migraine. May repeat in 2 hours if needed   Yes Esther Cordoba MD   topiramate (TOPAMAX) 25 MG tablet Take 1 tablet by mouth 2 (Two) Times a Day. 10/28/21  Yes Esther Cordoba MD       Objective     Vital Signs: /98 (BP Location: Left arm, Patient Position: Sitting, Cuff Size: Adult)   Pulse 97   Temp 97.5 °F (36.4 °C) (Skin)   Resp 18   Ht 175.3 cm (69\")   Wt 79.5 kg (175 lb 4.8 oz)   SpO2 98%   BMI 25.89 kg/m²   Physical Exam  Vitals and nursing note reviewed.   Constitutional:       Appearance: Normal appearance. He is obese.   HENT:      Head: Normocephalic and atraumatic.      Right Ear: Tympanic membrane, ear canal and external ear normal.      Left Ear: Tympanic membrane, ear canal and external ear normal.      Nose: Nose normal.      Mouth/Throat:      Mouth: Mucous membranes are moist.      Pharynx: Oropharynx is clear.   Eyes:      Extraocular Movements: Extraocular movements intact.      Conjunctiva/sclera: Conjunctivae normal.      Pupils: Pupils are equal, round, and reactive to light.   Cardiovascular:      Rate and Rhythm: Normal rate and regular rhythm.      Pulses: Normal pulses.      Heart sounds: Normal heart sounds.   Pulmonary:      Effort: Pulmonary " effort is normal.      Breath sounds: Normal breath sounds.   Abdominal:      General: Bowel sounds are normal.      Palpations: Abdomen is soft.   Musculoskeletal:         General: Normal range of motion.      Cervical back: Normal range of motion and neck supple.      Comments: Crepitus at knees bilaterally.   Skin:     General: Skin is warm and dry.      Capillary Refill: Capillary refill takes less than 2 seconds.   Neurological:      General: No focal deficit present.      Mental Status: He is alert and oriented to person, place, and time.      Cranial Nerves: No cranial nerve deficit.   Psychiatric:         Mood and Affect: Mood normal.         Behavior: Behavior normal.         Thought Content: Thought content normal.         Judgment: Judgment normal.         Patient's Body mass index is 25.89 kg/m². indicating that he is overweight (BMI 25-29.9). Obesity-related health conditions include the following: dyslipidemias. Obesity is unchanged. BMI is is above average; BMI management plan is completed. We discussed portion control and increasing exercise..      Results Reviewed:  Glucose   Date Value Ref Range Status   06/08/2021 106 74 - 109 mg/dL Final     BUN   Date Value Ref Range Status   06/08/2021 16 6 - 20 mg/dL Final     Creatinine   Date Value Ref Range Status   06/08/2021 0.9 0.5 - 1.2 mg/dL Final     Sodium   Date Value Ref Range Status   06/08/2021 140 136 - 145 mmol/L Final     Potassium   Date Value Ref Range Status   06/08/2021 4.1 3.5 - 5.0 mmol/L Final     Chloride   Date Value Ref Range Status   06/08/2021 107 98 - 111 mmol/L Final     CO2   Date Value Ref Range Status   06/08/2021 23 22 - 29 mmol/L Final     Calcium   Date Value Ref Range Status   06/08/2021 8.9 8.6 - 10.0 mg/dL Final     ALT (SGPT)   Date Value Ref Range Status   06/08/2021 11 5 - 41 U/L Final     AST (SGOT)   Date Value Ref Range Status   06/08/2021 14 5 - 40 U/L Final     WBC   Date Value Ref Range Status   06/08/2021 3.8  (L) 4.8 - 10.8 K/uL Final     Hematocrit   Date Value Ref Range Status   06/08/2021 44.1 42.0 - 52.0 % Final     Platelets   Date Value Ref Range Status   06/08/2021 251 130 - 400 K/uL Final     Triglycerides   Date Value Ref Range Status   06/08/2021 63 0 - 149 mg/dL Final     HDL Cholesterol   Date Value Ref Range Status   06/08/2021 61 55 - 121 mg/dL Final     Comment:     VALUES>60 MG/DL ARE ASSOCIATED WITH A DECREASED RISK OF  ATHEROSCLEROTIC CARDIOVASCULAR DISEASE     LDL Cholesterol    Date Value Ref Range Status   06/08/2021 99 <100 mg/dL Final     Comment:     <100 MG/DL=OPITIMAL    100-129 MG/DL=DESIRABLE    130-159 MG/DL BORDERLINE=INCREASED RISK OF ATHEROSCLEROTIC  CARDIOVASCULAR DISEASE    > OR = 160 MG/DL=ASSOCIATED WITH AN INCREASE RISK OF  ATHEROSCLEROTIC CARDIOVASCULAR DISEASE         Assessment / Plan     Assessment/Plan:  1. Mixed hyperlipidemia    - Comprehensive metabolic panel  - Lipid panel    2. Other neutropenia (HCC)    - CBC w AUTO Differential    3. Screening for malignant neoplasm of prostate    - PSA SCREENING    4. Pain of both elbows    - Ambulatory Referral to Orthopedic Surgery    5. Chronic pain of left knee    - Ambulatory Referral to Orthopedic Surgery    6. Basal cell carcinoma (BCC), unspecified site    - Ambulatory Referral to Dermatology        Return in about 3 months (around 3/9/2022). unless patient needs to be seen sooner or acute issues arise.        I have discussed the patient results/orders and and plan/recommendation with them at today's visit.      Sonal Pandey,    12/09/2021

## 2021-12-09 NOTE — PSYCHOTHERAPY
Behavioral Health Consultation  Delmis Cleaning, [address] Consultant  12/9/2021  10:27 AM      Time spent with Patient: 45 minutes  This is patient's 17th  [facility] [place] appointment.     Reason for Consult:          Chief Complaint   Patient presents with    Depression    Anxiety    Stress      Referring Provider: No referring provider defined for this encounter.        Feedback given to PCP.     S:  Patient reports problems with feeling stressed, overwhelmed.   Mentally, I have been doing ok. Nightmares, about the fingers coming out of the boat. What I always deal with that there is no closure with all these cases. Leaving them, and them dying, is the hardest, guilt, and it haunts me. Not grieving. I don't think I grieved for my mother, either.     Addressed current and underlying issues, trauma and current affects on functioning, the changes because of the virus and associated stress, explored and released associated emotions, explored new ways to deal and cope with these problems.   Explored triggers, releasing, and coping with traumatic events allen working for the [employer] cause stuttering.   We moved and sold our house. Working on settling in. It went quick, its good. Second grand child was born Oct. 13, they live in [place]. Went to visit, took a break. Thanksgiving with her family, took her mom. We are talking about long term care for her. Have not worked since June, officially retired in October. Still waiting on rating with [facility]. Started stuttering in this context. It is a nice change, a mental break. Because of changes with custodial and insurance, we had to change doctors. Already established with [facility].               O:  MSE:     Mood    Anxious  Depressed  Low self-esteem  [place]  Affect    Full affect  Appetite Fair  Sleep disturbance Yes,, have not had nightmares in last week  Fatigue Yes  Loss of pleasure Yes  Attention/Concentration    intact  Morbid ideation No  Suicide Assessment    no suicidal ideation           A:  Patient presents for consult due to problems with PTSD, depression, anxiety, chronic stressors stremming from exposure to stressful and traumatic events professionally and work related.        Continued consultation is clinically/medically necessary to support in learning new skills and build confidence to deal better with these issues. Patient response to consults, finds new strategies helpful.      Diagnosis:     1. PTSD (post-traumatic stress disorder)    2. YING (generalized anxiety disorder)    3. Mild episode of recurrent major depressive disorder (HCC)                   Diagnosis Date    Basal cell carcinoma       scalp, chest    Chronic lower back pain      CPAP (continuous positive airway pressure) dependence      Hyperlipidemia      RILEY (obstructive sleep apnea)      Sciatica              Plan:  Pt interventions:  Trained in strategies for increasing balanced thinking, Discussed self-care (sleep, nutrition, rewarding activities, social support, exercise), Discussed use of imagery, distractions, relaxation, mood management, communication training, questioning unhelpful thinking, problem-solving, and behavioral activation to manage pain and Supportive techniques, Affirmation and Normalization. Education about effects of these kind of stressors.  CBT and supportive techniques.         Pt Behavioral Change Plan:  See Pt Instructions

## 2021-12-09 NOTE — PATIENT INSTRUCTIONS
decrease) Decreased energy        Anger (at others or        God), Not eating/Weight changes Memory problems        Irritability, frustration Withdrawing from others Stomach and intestinal upset    Restlessness, difficulty concentrating, trouble making decisions Pain and headaches     Symptoms that are not normal and may signal the need to talk to a professional include:  Use of drugs, alcohol, violence, and thoughts of killing oneself. The duration of grief varies from person to person. Current research shows that the average recovery time is 18 to 24 months. Also, grief reactions can be stronger around anniversary or other significant dates, such as the anniversary of the persons death, birthdays, and holidays. The Stages of Grief  Grief therapists often describe stages of grief outlined by the research of Dr. Yuliana Romero. These stages do not always go in order. You may move back and forth among some of the stages and may even skip some of them. These stages are meant as a guide to help you understand your reactions and those of others who are grieving.  - Denial:  Denial (not acknowledging the loss) can help contain the shock of loss. Denial can act as a safety mechanism to block out grief until we are ready to handle it. - Sadness and depression:  Deep, intense grief and mourning appear during this stage. When the full understanding of our loss comes, it can seem overwhelming. During this stage, you may cry often and unexpectedly. You may not want to be around people or to do things that you normally enjoy. During this stage, it is best to remain as active as possible and to seek supportive people who will allow you to say what you need to or to cry when you need to. It is important to allow yourself to work through your full range and experience of emotions. - Anger:  Rage and anger can be intense toward the person who , toward friends and relatives, and even toward God. It is important to have an outlet to release anger through activities such as exercise, hobbies, or through therapy. Guilt, shame, and blame are feelings that need to be addressed, especially if it is toward you. - Acceptance: This stage includes coming to terms with the loss. It does not mean that you have found the answers to your questions or that you stop thinking about the person who is gone. It does signify a reinvestment in life and a willingness to readjust to your new circumstances while carrying the memory of your loved one with you. How to Help Yourself  1. Give yourself time to grieve. It is normal and important to express your grief and to work through the concerns that arise for you at this time. Stuffing your feelings may not be helpful and may delay or prolong your grief. 2. Find supportive people to reach out to during your grief. This is the time when the support of others may be the most helpful. Dont be afraid to tell them how they can best help, even if it means just listening. It is often very helpful to talk about your loss with people who will allow you to express your emotions. 3. Take care of your health. Often after a loss, we stop doing the things we need to for health care, such as exercising, eating correctly, keeping Dr. appointments, or taking prescribed medications. If you are on a health care regimen, it is important to continue to adhere to your treatment. 4. Postpone major life changes. Give yourself time to adjust to your loss before making plans to change jobs, move or sell your home, remarry, etc.  Grief can sometimes cloud your judgment and ability to make decisions. 5. Consider keeping a journal.  It is often helpful to write or tell the story of your loss and what it means to you as a way to work through your feelings. 6. Participate in activities.   Staying active through exercise, enjoyable activities, outings with supportive others, or even starting new hobbies can help us get through tough times while providing opportunities for constructive development and use of energy. 7. Find a way to memorialize your loved one. Planting a tree or garden in the name of your loved one, dedicating a work to their memory, contributing to a landon in their name, and other such activities can be helpful. 8. Consider joining grief-support groups or contacting a grief counselor for additional support and help. Remember that depressive symptoms (feeling sad) are a fundamental part of normal bereavement. Staying active and finding support from others can help you to work through the grief process. Grief Tips - Help for Those Who Mourn    The following are many ideas to help people who are mourning a loved ones death. Different kinds of losses dictate different responses, so not all of these ideas will suit everyone. Likewise, no two people grieve alike - what works for one may not work for another. Treat this list for what it is; a gathering of assorted suggestions that various people have tried with success. Perhaps what helped them will help you. The emphasis here is on specific, practical ideas. 1. Talk regularly with a friend. Talking with another about what you think and feel is one of the best things you can do for yourself. It helps relieve some of the pressure you may feel, it can give you a sense of perspective, and it keeps you in touch with others. Look for someone whos a good listener and who has a caring soul. Then speak whats on your mind and in your heart. If this feels one-sided let that be okay for this period of your life. Chances are the other person will find meaning in what theyre doing, and time will come when youll have the chance to be a good listener for someone else. Youll be a better listener then, if youre a good talker now. 2. Walk. Go for walks outside every day if you can.   Dont overdo it, but walk briskly enough that it feels invigorating. Sometimes try walking slowly enough so you can look carefully at what you see. Observe what nature has to offer you, what it can teach you. Enjoy as much as you are able to of the sights and sounds that come your way. If you like, walk with another person. 3. Carry or wear a linking object. Carry something in your pocket or purse that reminds you of the one who  - a keepsake they gave you perhaps, or small object they once carried or used or a memento you select for just this purpose. You might wear a piece of their jewelry in the same way. Whenever you want, reach for gaze upon this object and remember what it signifies. 4. Visit the grave. Not all people prefer to do this. But if it feels right to you, then do so. Dont let others convince you this is a morbid thing to do. Spend whatever time feels right there. Stand or sit in the quietness and do what comes naturally: be silent or talk, breathe deeply or cry, recollect or pray. You may wish to add your distinctive touch to the gravesite - straighten it a bit, or add little signs of your love. 5. Create a memory book. Compile photographs, which document your loved ones life. Arrange them into some sort of order so they tell a story. Add other elements if you want: diplomas, newspaper clippings, awards, accomplishments, and reminders of significant events. Put all this in a special binder and keep it for other people to look at if they wish. Go through it on your own if you desire. Reminisce as you do so. 6. Recall your dreams. Your dreams often have important things to say about your feelings and about your relationship with the one who . Your dreams may be scary or sad, especially early on. They may seem weird or crazy to you. You may find that your loved one appears in your dreams. Accept your dreams for what they are and see what you can learn from them.   No one knows that better than you. 7. Tell people what helps you and what doesnt. People around you may not understand what you need. So tell them. If hearing your loved ones name spoken aloud by others feels good, say so. If you need more time alone, or assistance with chores youre unable to complete, or an occasional hug, be honest.  People cant read your mind, so youll have to speak it. 8. Write things down. Most people who are grieving become more forgetful than usual.  So help yourself remember what you want by keeping track of it on paper or with whatever system works best for you. This may include writing down things you want to preserve about the person who has . 9. Ask for a copy of the [place]'s. If the  liturgy or memorial service holds special meaning for you because of what was spoken or read, ask for the words. Whoever participated in that ritual will feel gratified that what they prepared was appreciated. Turn to these words whenever you want. Some people find these thoughts provide even more help weeks and months after the service. 10. Remember the serenity prayer. This prayer is attributed to theologian Reyes Chandler, but its actually an ancient  [facility]. It has brought comfort and support to many that have suffered various kinds of afflictions. 11. Create a memory area at home. In a space that feels appropriate, arrange a small table that honors the person: a framed photograph or two, perhaps a prized possession or award or something they created or something they loved. This might be placed on a small table, a mantel or a desk. Some people like to use a grouping of candles, representing not just the person who  but others who have  as well. In that case a variety of candles can be arranged each representing a unique life. 12. Drink water. Grieving people can easily become dehydrated. Crying can naturally lead to that.   And with your normal routines turned upside down, you may simply not drink as much or as regularly as you did before this death. Make this a way you care for yourself. 13. Use your hands. Sometimes theres value in doing repetitive things with your hands, something you dont have to think about very much because it becomes second nature. Knitting and crocheting are like that. So are carving, woodworking, polishing, solving jigsaw puzzles, painting, braiding, shoveling, washing and countless other activities. 15. Give yourself respites from your grief. Just because youre grieving doesnt mean you must always be feeling sad or     forlorn. Theres value in sometimes consciously deciding that youll think about something else for awhile, or that youll do something youve always enjoyed doing. Sometimes this happens naturally and its only later you realize that your grief has taken a back seat. Let it, this is not an indication you love that person any less or that youre forgetting them. Its a sign that youre human and you need relief from the unrelenting pressure. It can also be a healthy sign youre healing. 15. See a grief counselor. If youre concerned about how youre feeling and how well youre adapting make an appointment   with a counselor who specializes in grief. Often youll learn what you need both about grief and about yourself as a griever in only a few sessions. Ask questions of the counselor before you sign on. What specific training does he or she have? What accreditation? A person who is a family therapist or a psychologist doesnt necessarily understand the unique issues of someone in grief. 12. Begin your day with your loved one. If your grief is young, youll probably wake up thinking of that person anyway. So why not decide that youll include her or him from the start? Focus this time in a positive way. Bring to your mind fulfilling memories.   Recall lessons that this person taught you, gifts he or she gave you. Think about how you can spend your day in ways that would be keeping in with your loved ones best self and with your best self. Then carry that best self with you through your day. 16. Invite some one to be your telephone akin. If your grief and sadness hit you especially hard at times and you have no   one nearby to turn to, ask someone you trust to be your telephone akin. Ask their permission for you to call them whenever you feel youre at loose ends, day or night. Then put their number beside your phone and call them if you need them. Dont abuse the privilege, of course. And covenant that some day it will be payback time - someday youll make yourself available to help someone else in the same way youve been helped. That will help you accept the care youre receiving. 18. Avoid certain people if you must.  No one likes to be unfriendly or cold. But if there are people in your life who make it  very difficult for you to do your grieving then do what you can to stay out of their way. Some people may lecture you or belittle you. 23. Donate their possessions meaningfully. Whether you give your loved ones personal possessions to someone you know   or to a stranger, find ways to pass these things along so that others might benefit from them. Family members of friends might like to receive keepsakes. They or others might deserve tools, utensils, books or sporting equipment. [employer] organizations can put clothes to good use. Some wish to do this quickly following the death, while others wish to wait awhile. 21. Donate in the others name. Honor the [employer] and spirit by giving a gift or gifts to a cause the other would   appreciate. A favorite landon? A local ? A building project? Extend that persons influence even further. 21. Create or commission a memory quilt.   Sew or invite others to sew with you, or hire someone to sew for you. However you get it completed, put together a wall hanging or a bedroom quilt that remembers the important life events of the one who . Take your time doing this. Make it what it is, a labor of love. 22. Take a yoga class. People of almost any age can do yoga. More than conditioning your body, it helps you relax and focus your mind. It can be woven into a practice of mediation. Its a gentle art for that time in your life when you deserve gentleness all around you. 23. Connect on the Internet. If youre Shah[place], search the Internet. Lito Hampton find many resources for people in grief,   as well as the opportunity to chat with fellow grievers. You can link up with others without leaving your home. Lito Hampton also find more to expand your horizons as a person who is beginning to grow. 24. Speak to a cleargyperson. If youre searching for answers to the larger questions about life and death, Restoration and spirituality, consider talking with a representative of your marcel, or even anothers marcel. Consider becoming a spiritual friend with another and making your time of grieving a time of personal exploring. Read of how others have responded to a loved ones death. You may feel that your own grief is all you can handle. But if youd like to look at the ways others have done it, try:  Beyond Grief:  A Guide for Recovering from the Death of a Loved One by Rosio 40 by Sam Wyatt and Richard Nevarez  The Grief Recovery Handbook: The Action Program for Moving Beyond Death, Divorce, & Other Losses by Stella An   A Grief Observed by Zohra Henderson  by Molly Marina When Good-Bye Is Forever by Stephen Stephenson  Men and Grief by Gama Marti or [address] H[address]to Banksle for a Son. There are many others. Check with a . 22. Learn about your loved one from others.   Listen to the stories others have to tell about the one, who , stories youre familiar with and those youve never heard before. Spend time with their friends, schoolmates or colleagues. Invite them into your home. Solicit the writings of others. Preserve whatever you find out. Celebrate your time together. 26. Take a day off. When the mood is just right, take a one-day vacation. Do whatever you want, or dont do whatever you want. Travel somewhere or stay inside by yourself. Be very active or dont do anything at all. Just make it your day, whatever that means for you. 32. Invite someone to give you feedback. Select someone you trust, preferably someone familiar with the working of grief, to give you his or her reaction when you ask for it. If you want to check out how clearly youre thinking, how accurately youre remembering, how effectively youre coping, go to that person. Pose your questions, then listen to their responses. What you choose to do with that information will be up to you. 28. Vent your anger rather than hold it in. You may feel awkward being angry when youre grieving, but anger is a common reaction. The expression holds true: anger is best out floating rather than in bloating. Even if you feel a bit ashamed as you do it, find ways to get it out of your system. [place], even if its in an empty house. Cry. Hit something soft. Throw eggs at something hard. Vacuum up a storm. Resist the temptation to be proper. 29. Give thanks every day. Whatever has happened to you, you still have things to be thankful for. Perhaps its your memories, your remaining family, your support, your work; you own health - all sorts of things. Draw your attention to those parts of life that are worth appreciating, then appreciate them. 30. Monitor signs of dependency. While its normal to become more dependent upon others for awhile immediately after a death, it will not be helpful to continue in that role long-term.   Watch for signs that youre prolonging your need for assistance. Congratulate yourself when you do things for yourself.

## 2021-12-10 ENCOUNTER — TELEPHONE (OUTPATIENT)
Dept: INTERNAL MEDICINE | Facility: CLINIC | Age: 51
End: 2021-12-10

## 2021-12-10 VITALS
TEMPERATURE: 97.5 F | HEART RATE: 97 BPM | OXYGEN SATURATION: 98 % | WEIGHT: 164.4 LBS | SYSTOLIC BLOOD PRESSURE: 121 MMHG | DIASTOLIC BLOOD PRESSURE: 98 MMHG | HEIGHT: 69 IN | BODY MASS INDEX: 24.35 KG/M2 | RESPIRATION RATE: 18 BRPM

## 2021-12-10 LAB
ALBUMIN SERPL-MCNC: 5 G/DL (ref 3.8–4.9)
ALBUMIN/GLOB SERPL: 2.2 {RATIO} (ref 1.2–2.2)
ALP SERPL-CCNC: 58 IU/L (ref 44–121)
ALT SERPL-CCNC: 24 IU/L (ref 0–44)
AST SERPL-CCNC: 16 IU/L (ref 0–40)
BASOPHILS # BLD AUTO: 0 X10E3/UL (ref 0–0.2)
BASOPHILS NFR BLD AUTO: 1 %
BILIRUB SERPL-MCNC: 0.4 MG/DL (ref 0–1.2)
BUN SERPL-MCNC: 12 MG/DL (ref 6–24)
BUN/CREAT SERPL: 13 (ref 9–20)
CALCIUM SERPL-MCNC: 9.3 MG/DL (ref 8.7–10.2)
CHLORIDE SERPL-SCNC: 103 MMOL/L (ref 96–106)
CHOLEST SERPL-MCNC: 188 MG/DL (ref 100–199)
CO2 SERPL-SCNC: 23 MMOL/L (ref 20–29)
CREAT SERPL-MCNC: 0.95 MG/DL (ref 0.76–1.27)
EOSINOPHIL # BLD AUTO: 0 X10E3/UL (ref 0–0.4)
EOSINOPHIL NFR BLD AUTO: 1 %
ERYTHROCYTE [DISTWIDTH] IN BLOOD BY AUTOMATED COUNT: 12.3 % (ref 11.6–15.4)
GLOBULIN SER CALC-MCNC: 2.3 G/DL (ref 1.5–4.5)
GLUCOSE SERPL-MCNC: 92 MG/DL (ref 65–99)
HCT VFR BLD AUTO: 48.8 % (ref 37.5–51)
HDLC SERPL-MCNC: 65 MG/DL
HGB BLD-MCNC: 16 G/DL (ref 13–17.7)
IMM GRANULOCYTES # BLD AUTO: 0 X10E3/UL (ref 0–0.1)
IMM GRANULOCYTES NFR BLD AUTO: 0 %
LDLC SERPL CALC-MCNC: 113 MG/DL (ref 0–99)
LYMPHOCYTES # BLD AUTO: 0.9 X10E3/UL (ref 0.7–3.1)
LYMPHOCYTES NFR BLD AUTO: 29 %
MCH RBC QN AUTO: 29.6 PG (ref 26.6–33)
MCHC RBC AUTO-ENTMCNC: 32.8 G/DL (ref 31.5–35.7)
MCV RBC AUTO: 90 FL (ref 79–97)
MONOCYTES # BLD AUTO: 0.3 X10E3/UL (ref 0.1–0.9)
MONOCYTES NFR BLD AUTO: 9 %
NEUTROPHILS # BLD AUTO: 1.9 X10E3/UL (ref 1.4–7)
NEUTROPHILS NFR BLD AUTO: 60 %
PLATELET # BLD AUTO: 267 X10E3/UL (ref 150–450)
POTASSIUM SERPL-SCNC: 4.7 MMOL/L (ref 3.5–5.2)
PROT SERPL-MCNC: 7.3 G/DL (ref 6–8.5)
PSA SERPL-MCNC: 0.4 NG/ML (ref 0–4)
RBC # BLD AUTO: 5.41 X10E6/UL (ref 4.14–5.8)
SODIUM SERPL-SCNC: 141 MMOL/L (ref 134–144)
TRIGL SERPL-MCNC: 53 MG/DL (ref 0–149)
VLDLC SERPL CALC-MCNC: 10 MG/DL (ref 5–40)
WBC # BLD AUTO: 3.2 X10E3/UL (ref 3.4–10.8)

## 2021-12-10 NOTE — TELEPHONE ENCOUNTER
----- Message from Sonal Pandey DO sent at 12/10/2021  8:05 AM CST -----  Lab work is stable except the ldl is slightly higher than last check.

## 2021-12-10 NOTE — TELEPHONE ENCOUNTER
Called and spoke to patient regarding lab results. He voiced understanding and had no further questions.

## 2022-01-26 ENCOUNTER — TELEPHONE (OUTPATIENT)
Dept: PSYCHIATRY | Age: 52
End: 2022-01-26

## 2022-01-26 NOTE — TELEPHONE ENCOUNTER
Left voicemail for return call to confirm appointment with Betito Ragsdale. Advised to call [phone].

## 2022-01-27 ENCOUNTER — OFFICE VISIT (OUTPATIENT)
Dept: PSYCHIATRY | Age: 52
End: 2022-01-27
Payer: OTHER GOVERNMENT

## 2022-01-27 DIAGNOSIS — F33.0 MDD (MAJOR DEPRESSIVE DISORDER), RECURRENT EPISODE, MILD (HCC): ICD-10-CM

## 2022-01-27 DIAGNOSIS — F43.10 PTSD (POST-TRAUMATIC STRESS DISORDER): Primary | ICD-10-CM

## 2022-01-27 DIAGNOSIS — F41.1 GAD (GENERALIZED ANXIETY DISORDER): ICD-10-CM

## 2022-01-27 PROCEDURE — 90834 PSYTX W PT 45 MINUTES: CPT | Performed by: SOCIAL WORKER

## 2022-01-27 NOTE — PATIENT INSTRUCTIONS
Recommendations to patient:               0. Practice new coping, stress management, relaxation skills at least                   two times a day for at least 10-30 minutes.              2. Find at least one positive outlet per day that makes you feel better.              3. Talk things over with a good friend. Practice letting things go.              4. Stop, breathe, reset. \"I am ok. \"              5. I can find closure.               6. I can give my mind direction.              7. Stay in the moment.   [phone]. I can feel, when my body and my mind feels calmer.   [phone]. Put things up at night, compartmentalize.               10. I can take the foot off the accelerator.      Scheduled follow up appointment.     Call for a sooner appointment if needed or if you need to change or cancel you appointment     Allison May, [phone] and choose the option for behavioral health  You can also send her a message on My Chart. Be aware that all my messages are linked to her. Grief Tips - Help for Those Who Mourn    The following are many ideas to help people who are mourning a loved ones death. Different kinds of losses dictate different responses, so not all of these ideas will suit everyone. Likewise, no two people grieve alike - what works for one may not work for another. Treat this list for what it is; a gathering of assorted suggestions that various people have tried with success. Perhaps what helped them will help you. The emphasis here is on specific, practical ideas. 1. Talk regularly with a friend. Talking with another about what you think and feel is one of the best things you can do for yourself. It helps relieve some of the pressure you may feel, it can give you a sense of perspective, and it keeps you in touch with others. Look for someone whos a good listener and who has a caring soul. Then speak whats on your mind and in your heart.   If this feels one-sided let that be okay for this period of your life. Chances are the other person will find meaning in what theyre doing, and time will come when youll have the chance to be a good listener for someone else. Youll be a better listener then, if youre a good talker now. 2. Walk. Go for walks outside every day if you can. Dont overdo it, but walk briskly enough that it feels invigorating. Sometimes try walking slowly enough so you can look carefully at what you see. Observe what nature has to offer you, what it can teach you. Enjoy as much as you are able to of the sights and sounds that come your way. If you like, walk with another person. 3. Carry or wear a linking object. Carry something in your pocket or purse that reminds you of the one who  - a keepsake they gave you perhaps, or small object they once carried or used or a memento you select for just this purpose. You might wear a piece of their jewelry in the same way. Whenever you want, reach for gaze upon this object and remember what it signifies. 4. Visit the grave. Not all people prefer to do this. But if it feels right to you, then do so. Dont let others convince you this is a morbid thing to do. Spend whatever time feels right there. Stand or sit in the quietness and do what comes naturally: be silent or talk, breathe deeply or cry, recollect or pray. You may wish to add your distinctive touch to the gravesite - straighten it a bit, or add little signs of your love. 5. Create a memory book. Compile photographs, which document your loved ones life. Arrange them into some sort of order so they tell a story. Add other elements if you want: diplomas, newspaper clippings, awards, accomplishments, and reminders of significant events. Put all this in a special binder and keep it for other people to look at if they wish. Go through it on your own if you desire. Reminisce as you do so. 6. Recall your dreams.   Your dreams often have important things to say about your feelings and about your relationship with the one who . Your dreams may be scary or sad, especially early on. They may seem weird or crazy to you. You may find that your loved one appears in your dreams. Accept your dreams for what they are and see what you can learn from them. No one knows that better than you. 7. Tell people what helps you and what doesnt. People around you may not understand what you need. So tell them. If hearing your loved ones name spoken aloud by others feels good, say so. If you need more time alone, or assistance with chores youre unable to complete, or an occasional hug, be honest.  People cant read your mind, so youll have to speak it. 8. Write things down. Most people who are grieving become more forgetful than usual.  So help yourself remember what you want by keeping track of it on paper or with whatever system works best for you. This may include writing down things you want to preserve about the person who has . 9. Ask for a copy of the Nehemiah's. If the  liturgy or memorial service holds special meaning for you because of what was spoken or read, ask for the words. Whoever participated in that ritual will feel gratified that what they prepared was appreciated. Turn to these words whenever you want. Some people find these thoughts provide even more help weeks and months after the service. 10. Remember the serenity prayer. This prayer is attributed to theologian Eldon Garcia, but its actually an ancient  [facility]. It has brought comfort and support to many that have suffered various kinds of afflictions. 11. Create a memory area at home. In a space that feels appropriate, arrange a small table that honors the person: a framed photograph or two, perhaps a prized possession or award or something they created or something they loved.   This might be placed on a small table, a mantel or a desk. Some people like to use a grouping of candles, representing not just the person who  but others who have  as well. In that case a variety of candles can be arranged each representing a unique life. 12. Drink water. Grieving people can easily become dehydrated. Crying can naturally lead to that. And with your normal routines turned upside down, you may simply not drink as much or as regularly as you did before this death. Make this a way you care for yourself. 13. Use your hands. Sometimes theres value in doing repetitive things with your hands, something you dont have to think about very much because it becomes second nature. Knitting and crocheting are like that. So are carving, woodworking, polishing, solving jigsaw puzzles, painting, braiding, shoveling, washing and countless other activities. 15. Give yourself respites from your grief. Just because youre grieving doesnt mean you must always be feeling sad or     forlorn. Theres value in sometimes consciously deciding that youll think about something else for awhile, or that youll do something youve always enjoyed doing. Sometimes this happens naturally and its only later you realize that your grief has taken a back seat. Let it, this is not an indication you love that person any less or that youre forgetting them. Its a sign that youre human and you need relief from the unrelenting pressure. It can also be a healthy sign youre healing. 15. See a grief counselor. If youre concerned about how youre feeling and how well youre adapting make an appointment   with a counselor who specializes in grief. Often youll learn what you need both about grief and about yourself as a griever in only a few sessions. Ask questions of the counselor before you sign on. What specific training does he or she have? What accreditation?   A person who is a family therapist or a psychologist doesnt necessarily understand the unique issues of someone in grief. 12. Begin your day with your loved one. If your grief is young, youll probably wake up thinking of that person anyway. So why not decide that youll include her or him from the start? Focus this time in a positive way. Bring to your mind fulfilling memories. Recall lessons that this person taught you, gifts he or she gave you. Think about how you can spend your day in ways that would be keeping in with your loved ones best self and with your best self. Then carry that best self with you through your day. 16. Invite some one to be your telephone akin. If your grief and sadness hit you especially hard at times and you have no   one nearby to turn to, ask someone you trust to be your telephone akin. Ask their permission for you to call them whenever you feel youre at loose ends, day or night. Then put their number beside your phone and call them if you need them. Dont abuse the privilege, of course. And covenant that some day it will be payback time - someday youll make yourself available to help someone else in the same way youve been helped. That will help you accept the care youre receiving. 18. Avoid certain people if you must.  No one likes to be unfriendly or cold. But if there are people in your life who make it  very difficult for you to do your grieving then do what you can to stay out of their way. Some people may lecture you or belittle you. 23. Donate their possessions meaningfully. Whether you give your loved ones personal possessions to someone you know   or to a stranger, find ways to pass these things along so that others might benefit from them. Family members of friends might like to receive keepsakes. They or others might deserve tools, utensils, books or sporting equipment. [employer] organizations can put clothes to good use.   Some wish to do this quickly following the death, while others wish to wait awhile. 21. Donate in the others name. Honor the [employer] and spirit by giving a gift or gifts to a cause the other would   appreciate. A favorite landon? A local ? A building project? Extend that persons influence even further. 21. Create or commission a memory quilt. Sew or invite others to sew with you, or hire someone to sew for you. However you get it completed, put together a wall hanging or a bedroom quilt that remembers the important life events of the one who . Take your time doing this. Make it what it is, a labor of love. 22. Take a yoga class. People of almost any age can do yoga. More than conditioning your body, it helps you relax and focus your mind. It can be woven into a practice of mediation. Its a gentle art for that time in your life when you deserve gentleness all around you. 23. Connect on the Internet. If youre [employer], search the Internet. [employer] find many resources for people in grief,   as well as the opportunity to chat with fellow grievers. You can link up with others without leaving your home. Complete [employer] Manual also find more to expand your horizons as a person who is beginning to grow. 24. Speak to a cleargyperson. If youre searching for answers to the larger questions about life and death, Gnosticist and spirituality, consider talking with a representative of your marcel, or even anothers marcel. Consider becoming a spiritual friend with another and making your time of grieving a time of personal exploring. Read of how others have responded to a loved ones death. You may feel that your own grief is all you can handle. But if youd like to look at the ways others have done it, try:  Beyond Grief:  A Guide for Recovering from the Death of a Loved One by Rosio 40 by Ace Gracia and Chanell Hidalgo  The Grief Recovery Handbook:   The Action Program for Moving Beyond Death, Divorce, & Other Losses by Davonte Stock   A Grief Observed by Brenda Smith  by Negin Ramos When Good-Bye Is Forever by Radha Castro   Men and Grief by Regina Garcia or Blake Boyde Chase Acevedo for a Son. There are many others. Check with a . 22. Learn about your loved one from others. Listen to the stories others have to tell about the one, who , stories youre familiar with and those youve never heard before. Spend time with their friends, schoolmates or colleagues. Invite them into your home. Solicit the writings of others. Preserve whatever you find out. Celebrate your time together. 26. Take a day off. When the mood is just right, take a one-day vacation. Do whatever you want, or dont do whatever you want. Travel somewhere or stay inside by yourself. Be very active or dont do anything at all. Just make it your day, whatever that means for you. 32. Invite someone to give you feedback. Select someone you trust, preferably someone familiar with the working of grief, to give you his or her reaction when you ask for it. If you want to check out how clearly youre thinking, how accurately youre remembering, how effectively youre coping, go to that person. Pose your questions, then listen to their responses. What you choose to do with that information will be up to you. 28. Vent your anger rather than hold it in. You may feel awkward being angry when youre grieving, but anger is a common reaction. The expression holds true: anger is best out floating rather than in bloating. Even if you feel a bit ashamed as you do it, find ways to get it out of your system. [place], even if its in an empty house. Cry. Hit something soft. Throw eggs at something hard. Vacuum up a storm. Resist the temptation to be proper. 29. Give thanks every day. Whatever has happened to you, you still have things to be thankful for.   Perhaps its your memories, your remaining family, your support, your work; you own health - all sorts of things. Draw your attention to those parts of life that are worth appreciating, then appreciate them. 30. Monitor signs of dependency. While its normal to become more dependent upon others for awhile immediately after a death, it will not be helpful to continue in that role long-term. Watch for signs that youre prolonging your need for assistance. Congratulate yourself when you do things for yourself.
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
## 2022-03-09 ENCOUNTER — TELEPHONE (OUTPATIENT)
Dept: PSYCHIATRY | Age: 52
End: 2022-03-09

## 2022-03-09 NOTE — TELEPHONE ENCOUNTER
Left voicemail for return call to confirm appointment with Mat Mealing. Advised to call [phone].

## 2022-03-10 ENCOUNTER — OFFICE VISIT (OUTPATIENT)
Dept: PSYCHIATRY | Age: 52
End: 2022-03-10
Payer: OTHER GOVERNMENT

## 2022-03-10 DIAGNOSIS — F41.1 GAD (GENERALIZED ANXIETY DISORDER): ICD-10-CM

## 2022-03-10 DIAGNOSIS — F33.0 MDD (MAJOR DEPRESSIVE DISORDER), RECURRENT EPISODE, MILD (HCC): ICD-10-CM

## 2022-03-10 DIAGNOSIS — F43.10 PTSD (POST-TRAUMATIC STRESS DISORDER): Primary | ICD-10-CM

## 2022-03-10 PROCEDURE — 90834 PSYTX W PT 45 MINUTES: CPT | Performed by: SOCIAL WORKER

## 2022-03-10 NOTE — PATIENT INSTRUCTIONS
Recommendations to patient:               0. Practice new coping, stress management, relaxation skills at least                   two times a day for at least 10-30 minutes.              2. Find at least one positive outlet per day that makes you feel better.              3. Talk things over with a good friend. Practice letting things go.              4. Stop, breathe, reset. \"I am ok. \"              9. I can find closure.               5. I can give my mind direction.              7. Stay in the moment.   [phone]. I can feel, when my body and my mind feels calmer.   [phone]. Put things up at night, compartmentalize.               10. I can take the foot off the accelerator.      Scheduled follow up appointment.     Call for a sooner appointment if needed or if you need to change or cancel you appointment     Allison Alvarez, [phone] and choose the option for behavioral health  You can also send her a message on My Chart. Be aware that all my messages are linked to her. If you receive a survey after visiting one of our offices, please take time to share your experience about your office visit. These surveys are confidential and no health information about you is shared. We highly welcome your feedback to continuously improve our services for you.
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
## 2022-04-27 RX ORDER — FLUTICASONE PROPIONATE 50 MCG
SPRAY, SUSPENSION (ML) NASAL
Qty: 48 G | Refills: 1 | Status: SHIPPED | OUTPATIENT
Start: 2022-04-27

## 2022-04-27 RX ORDER — ATORVASTATIN CALCIUM 10 MG/1
10 TABLET, FILM COATED ORAL DAILY
Qty: 90 TABLET | Refills: 1 | Status: SHIPPED | OUTPATIENT
Start: 2022-04-27

## 2022-04-28 ENCOUNTER — OFFICE VISIT (OUTPATIENT)
Dept: PSYCHIATRY | Age: 52
End: 2022-04-28
Payer: OTHER GOVERNMENT

## 2022-04-28 DIAGNOSIS — F43.10 PTSD (POST-TRAUMATIC STRESS DISORDER): Primary | ICD-10-CM

## 2022-04-28 DIAGNOSIS — F41.1 GAD (GENERALIZED ANXIETY DISORDER): ICD-10-CM

## 2022-04-28 DIAGNOSIS — F33.0 MDD (MAJOR DEPRESSIVE DISORDER), RECURRENT EPISODE, MILD (HCC): ICD-10-CM

## 2022-04-28 PROCEDURE — 90834 PSYTX W PT 45 MINUTES: CPT | Performed by: SOCIAL WORKER

## 2022-04-28 NOTE — PATIENT INSTRUCTIONS
Recommendations to patient:               3. Practice new coping, stress management, relaxation skills at least                   two times a day for at least 10-30 minutes.              2. Find at least one positive outlet per day that makes you feel better.              3. Talk things over with a good friend. Practice letting things go.              4. Stop, breathe, reset. \"I am ok. \"              8. I can find closure.               9. I can give my mind direction.              7. Stay in the moment.   [phone]. I can feel, when my body and my mind feels calmer.   [phone]. Put things up at night, compartmentalize.               10. I can take the foot off the accelerator.      Scheduled follow up appointment.     Call for a sooner appointment if needed or if you need to change or cancel you appointment    Allison May, [phone] and choose the option for behavioral health  You can also send her a message on My Chart. Be aware that all my messages are linked to her.

## 2022-04-28 NOTE — PROGRESS NOTES
Behavioral Health Consultation  Manuel Arriaga, [address] Consultant  4/28/2022  11:30 AM    Time spent with Patient: 45 minutes  This is patient's 20th  [facility] appointment.     Reason for Consult:          Chief Complaint   Patient presents with    Depression    Anxiety    Stress      Referring Provider: No referring provider defined for this encounter.        Feedback given to PCP.     S:  Patient reports problems with feeling anxious. It has been busy. Sporadic episodes in last six months. All the things that are going on in my mind, all the time.       Addressed current and underlying issues, trauma and current affects on functioning, the changes because of the virus and associated stress, explored and released associated emotions, explored new ways to deal and cope with these problems.   Explored triggers, releasing, and coping with traumatic events wile working for the [employer] and flashbacks still cause stuttering.   Helped sons family, grand daughter and his parents from [place]. Had good visit and it went well. Been a good month, sleeping better, less nightmares. Walking in the morning, working on the house and out side, going to the [employer]. With the outcome of this process, it validates my decision, it was hard to go through.           O:  MSE:     Mood    Anxious  Depressed  Low self-esteem  [place]  Affect    Full affect  Appetite Fair  Sleep disturbance Yes  Fatigue Yes  Loss of pleasure Yes  Attention/Concentration    intact  Morbid ideation No  Suicide Assessment    no suicidal ideation           A:  Patient presents for consult due to problems with PTSD, depression, anxiety, chronic stressors stremming from exposure to stressful and traumatic events professionally and work related.        Continued consultation is clinically/medically necessary to support in learning new skills and build confidence to deal better with these issues.   Patient response to consults, finds new strategies helpful.      Diagnosis:     1. PTSD (post-traumatic stress disorder)    2. YING (generalized anxiety disorder)    3. Mild episode of recurrent major depressive disorder (HCC)                   Diagnosis Date    Basal cell carcinoma       scalp, chest    Chronic lower back pain      CPAP (continuous positive airway pressure) dependence      Hyperlipidemia      RILEY (obstructive sleep apnea)      Sciatica              Plan:  Pt interventions:  Trained in strategies for increasing balanced thinking, Discussed self-care (sleep, nutrition, rewarding activities, social support, exercise), Discussed use of imagery, distractions, relaxation, mood management, communication training, questioning unhelpful thinking, problem-solving, and behavioral activation to manage pain and Supportive techniques, Affirmation and Normalization. Education about effects of these kind of stressors.  CBT and supportive techniques.         Pt Behavioral Change Plan:  See Pt Instructions

## 2022-07-11 ENCOUNTER — TELEPHONE (OUTPATIENT)
Dept: PSYCHIATRY | Age: 52
End: 2022-07-11

## 2023-07-21 RX ORDER — FLUTICASONE PROPIONATE 50 MCG
SPRAY, SUSPENSION (ML) NASAL
Qty: 48 G | Refills: 1 | OUTPATIENT
Start: 2023-07-21

## 2023-12-04 ENCOUNTER — PRE-ADMISSION TESTING (OUTPATIENT)
Dept: PREADMISSION TESTING | Facility: HOSPITAL | Age: 53
End: 2023-12-04
Payer: OTHER GOVERNMENT

## 2023-12-04 VITALS
HEART RATE: 72 BPM | BODY MASS INDEX: 26.5 KG/M2 | OXYGEN SATURATION: 96 % | HEIGHT: 68 IN | WEIGHT: 174.82 LBS | SYSTOLIC BLOOD PRESSURE: 149 MMHG | RESPIRATION RATE: 18 BRPM | DIASTOLIC BLOOD PRESSURE: 89 MMHG

## 2023-12-04 LAB
DEPRECATED RDW RBC AUTO: 41.8 FL (ref 37–54)
ERYTHROCYTE [DISTWIDTH] IN BLOOD BY AUTOMATED COUNT: 12.7 % (ref 12.3–15.4)
HCT VFR BLD AUTO: 45.4 % (ref 37.5–51)
HGB BLD-MCNC: 14.8 G/DL (ref 13–17.7)
MCH RBC QN AUTO: 29.4 PG (ref 26.6–33)
MCHC RBC AUTO-ENTMCNC: 32.6 G/DL (ref 31.5–35.7)
MCV RBC AUTO: 90.1 FL (ref 79–97)
PLATELET # BLD AUTO: 244 10*3/MM3 (ref 140–450)
PMV BLD AUTO: 10.1 FL (ref 6–12)
RBC # BLD AUTO: 5.04 10*6/MM3 (ref 4.14–5.8)
WBC NRBC COR # BLD AUTO: 5.23 10*3/MM3 (ref 3.4–10.8)

## 2023-12-04 PROCEDURE — 85027 COMPLETE CBC AUTOMATED: CPT

## 2023-12-04 PROCEDURE — 36415 COLL VENOUS BLD VENIPUNCTURE: CPT

## 2023-12-04 NOTE — DISCHARGE INSTRUCTIONS
Before you come to the hospital        Arrival time: AS DIRECTED BY OFFICE     YOU MAY TAKE THE FOLLOWING MEDICATION(S) THE MORNING OF SURGERY WITH A SIP OF WATER: ***           ALL OTHER HOME MEDICATION CHECK WITH YOUR PHYSICIAN (especially if   you are taking diabetes medicines or blood thinners)    Do not take any Erectile Dysfunction medications (EX: CIALIS, VIAGRA) 24 hours prior to surgery.      If you were given and instructed to use a germ- killing soap, use as directed the night before surgery and again the morning of surgery or as directed by your surgeon. (Use one-half of the bottle with each shower.)   See attached information for How to Use Chlorhexidine for Bathing if applicable.            Eating and drinking restrictions prior to scheduled arrival time    2 Hours before arrival time STOP   Drinking Clear liquids (water, black coffee-NO CREAM,  apple juice-no pulp)    Clear Liquids    Water and flavored water                                                                      Clear Fruit juices, such as cranberry juice and apple juice.  Black coffee (NO cream of any kind, including powdered).  Plain tea  Clear bouillon or broth.  Flavored gelatin.  Soda.  Gatorade or Powerade.    8 Hours before arrival time STOP   All food, full liquids, and dairy products  Full liquid examples  Juices that have pulp.  Frozen ice pops that contain fruit pieces.  Coffee with creamer  Milk.  Yogurt.    (It is extremely important that you follow these guidelines to prevent delay or cancelation of your procedure)                       MANAGING PAIN AFTER SURGERY    We know you are probably wondering what your pain will be like after surgery.  Following surgery it is unrealistic to expect you will not have pain.   Pain is how our bodies let us know that something is wrong or cautions us to be careful.  That said, our goal is to make your pain tolerable.    Methods we may use to treat your pain include (oral or IV  medications, PCAs, epidurals, nerve blocks, etc.)   While some procedures require IV pain medications for a short time after surgery, transitioning to pain medications by mouth allows for better management of pain.   Your nurse will encourage you to take oral pain medications whenever possible.  IV medications work almost immediately, but only last a short while.  Taking medications by mouth allows for a more constant level of medication in your blood stream for a longer period of time.      Once your pain is out of control it is harder to get back under control.  It is important you are aware when your next dose of pain medication is due.  If you are admitted, your nurse may write the time of your next dose on the white board in your room to help you remember.      We are interested in your pain and encourage you to inform us about aggravating factors during your visit.   Many times a simple repositioning every few hours can make a big difference.    If your physician says it is okay, do not let your pain prevent you from getting out of bed. Be sure to call your nurse for assistance prior to getting up so you do not fall.      Before surgery, please decide your tolerable pain goal.  These faces help describe the pain ratings we use on a 0-10 scale.   Be prepared to tell us your goal and whether or not you take pain or anxiety medications at home.          Preparing for Surgery  Preparing for surgery is an important part of your care. It can make things go more smoothly and help you avoid complications. The steps leading up to surgery may vary among hospitals. Follow all instructions given to you by your health care providers. Ask questions if you do not understand something. Talk about any concerns that you have.  Here are some questions to consider asking before your surgery:  If my surgery is not an emergency (is elective), when would be the best time to have the surgery?  What arrangements do I need to make for  work, home, or school?  What will my recovery be like? How long will it be before I can return to normal activities?  Will I need to prepare my home? Will I need to arrange care for me or my children?  Should I expect to have pain after surgery? What are my pain management options? Are there nonmedical options that I can try for pain?  Tell a health care provider about:  Any allergies you have.  All medicines you are taking, including vitamins, herbs, eye drops, creams, and over-the-counter medicines.  Any problems you or family members have had with anesthetic medicines.  Any blood disorders you have.  Any surgeries you have had.  Any medical conditions you have.  Whether you are pregnant or may be pregnant.  What are the risks?  The risks and complications of surgery depend on the specific procedure that you have. Discuss all the risks with your health care providers before your surgery. Ask about common surgical complications, which may include:  Infection.  Bleeding or a need for blood replacement (transfusion).  Allergic reactions to medicines.  Damage to surrounding nerves, tissues, or structures.  A blood clot.  Scarring.  Failure of the surgery to correct the problem.  Follow these instructions before the procedure:  Several days or weeks before your procedure  You may have a physical exam by your primary health care provider to make sure it is safe for you to have surgery.  You may have testing. This may include a chest X-ray, blood and urine tests, electrocardiogram (ECG), or other testing.  Ask your health care provider about:  Changing or stopping your regular medicines. This is especially important if you are taking diabetes medicines or blood thinners.  Taking medicines such as aspirin and ibuprofen. These medicines can thin your blood. Do not take these medicines unless your health care provider tells you to take them.  Taking over-the-counter medicines, vitamins, herbs, and supplements.  Do not use  any products that contain nicotine or tobacco, such as cigarettes and e-cigarettes. If you need help quitting, ask your health care provider.  Avoid alcohol.  Ask your health care provider if there are exercises you can do to prepare for surgery.  Eat a healthy diet.   Plan to have someone 18 years of age or older to take you home from the hospital. We will need to verify your ride on the morning of surgery if you are being discharged home on the same day. Tell your ride to be expecting a call from the hospital prior to your procedure.   Plan to have a responsible adult care for you for at least 24 hours after you leave the hospital or clinic. This is important.  The day before your procedure  You may be given antibiotic medicine to take by mouth to help prevent infection. Take it as told by your health care provider.  You may be asked to shower with a germ-killing soap.  Follow instructions from your health care provider about eating and drinking restrictions. This includes gum, mints and hard candy.  Pack comfortable clothes according to your procedure.   The day of your procedure  You may need to take another shower with a germ-killing soap before you leave home in the morning.  With a small sip of water, take only the medicines that you are told to take.  Remove all jewelry including rings.   Leave anything you consider valuable at home except hearing aids if needed.  You do not need to bring your home medications into the hospital.   Do not wear any makeup, nail polish, powder, deodorant, lotion, hair accessories, or anything on your skin or body except your clothes.  If you will be staying in the hospital, bring a case to hold your glasses, contacts, or dentures. You may also want to bring your robe and non-skid footwear.       (Do not use denture adhesives since you will be asked to remove them during  surgery).   If you wear oxygen at home, bring it with you the day of surgery.  If instructed by your health  care provider, bring your sleep apnea device with you on the day of your surgery (if this applies to you).  You may want to leave your suitcase and sleep apnea device in the car until after surgery.   Arrive at the hospital as scheduled.  Bring a friend or family member with you who can help to answer questions and be present while you meet with your health care provider.  At the hospital  When you arrive at the hospital:  Go to registration located at the main entrance of the hospital. You will be registered and given a beeper and a sticker sheet. Take the stickers to the Outpatient nurses desk and place in the black tray. This is to notify staff that you have arrived. Then return to the lobby to wait.   When your beeper lights up and vibrates proceed through the double doors, under the stairs, and a member of the Outpatient Surgery staff will escort you to your preoperative room.  You may have to wear compression sleeves. These help to prevent blood clots and reduce swelling in your legs.  An IV may be inserted into one of your veins.              In the operating room, you may be given one or more of the following:        A medicine to help you relax (sedative).        A medicine to numb the area (local anesthetic).        A medicine to make you fall asleep (general anesthetic).        A medicine that is injected into an area of your body to numb everything below the                      injection site (regional anesthetic).  You may be given an antibiotic through your IV to help prevent infection.  Your surgical site will be marked or identified.    Contact a health care provider if you:  Develop a fever of more than 100.4°F (38°C) or other feelings of illness during the 48 hours before your surgery.  Have symptoms that get worse.  Have questions or concerns about your surgery.  Summary  Preparing for surgery can make the procedure go more smoothly and lower your risk of complications.  Before surgery, make a  list of questions and concerns to discuss with your surgeon. Ask about the risks and possible complications.  In the days or weeks before your surgery, follow all instructions from your health care provider. You may need to stop smoking, avoid alcohol, follow eating restrictions, and change or stop your regular medicines.  Contact your surgeon if you develop a fever or other signs of illness during the few days before your surgery.  This information is not intended to replace advice given to you by your health care provider. Make sure you discuss any questions you have with your health care provider.  Document Revised: 12/21/2018 Document Reviewed: 10/23/2018  [employer] Patient Education © 2021 [employer]         How to Use Chlorhexidine Before Surgery  Chlorhexidine gluconate (CHG) is a germ-killing (antiseptic) solution that is used to clean the skin. It can get rid of the bacteria that normally live on the skin and can keep them away for about 24 hours. To clean your skin with CHG, you may be given:  A CHG solution to use in the shower or as part of a sponge bath.  A prepackaged cloth that contains CHG.  Cleaning your skin with CHG may help lower the risk for infection:  While you are staying in the intensive care unit of the hospital.  If you have a vascular access, such as a central line, to provide short-term or long-term access to your veins.  If you have a catheter to drain urine from your bladder.  If you are on a ventilator. A ventilator is a machine that helps you breathe by moving air in and out of your lungs.  After surgery.  What are the risks?  Risks of using CHG include:  A skin reaction.  Hearing loss, if CHG gets in your ears and you have a perforated eardrum.  Eye injury, if CHG gets in your eyes and is not rinsed out.  The CHG product catching fire.  Make sure that you avoid smoking and flames after applying CHG to your skin.  Do not use CHG:  If you have a chlorhexidine allergy or have  previously reacted to chlorhexidine.  On babies younger than 2 months of age.  How to use CHG solution  Use CHG only as told by your health care provider, and follow the instructions on the label.  Use the full amount of CHG as directed. Usually, this is one bottle.  During a shower    Follow these steps when using CHG solution during a shower (unless your health care provider gives you different instructions):  Start the shower.  Use your normal soap and shampoo to wash your face and hair.  Turn off the shower or move out of the shower stream.  Pour the CHG onto a clean washcloth. Do not use any type of brush or rough-edged sponge.  Starting at your neck, lather your body down to your toes. Make sure you follow these instructions:  If you will be having surgery, pay special attention to the part of your body where you will be having surgery. Scrub this area for at least 1 minute.  Do not use CHG on your head or face. If the solution gets into your ears or eyes, rinse them well with water.  Avoid your genital area.  Avoid any areas of skin that have broken skin, cuts, or scrapes.  Scrub your back and under your arms. Make sure to wash skin folds.  Let the lather sit on your skin for 1-2 minutes or as long as told by your health care provider.  Thoroughly rinse your entire body in the shower. Make sure that all body creases and crevices are rinsed well.  Dry off with a clean towel. Do not put any substances on your body afterward--such as powder, lotion, or perfume--unless you are told to do so by your health care provider. Only use lotions that are recommended by the .  Put on clean clothes or pajamas.  If it is the night before your surgery, sleep in clean sheets.     During a sponge bath  Follow these steps when using CHG solution during a sponge bath (unless your health care provider gives you different instructions):  Use your normal soap and shampoo to wash your face and hair.  Pour the CHG onto a  clean washcloth.  Starting at your neck, lather your body down to your toes. Make sure you follow these instructions:  If you will be having surgery, pay special attention to the part of your body where you will be having surgery. Scrub this area for at least 1 minute.  Do not use CHG on your head or face. If the solution gets into your ears or eyes, rinse them well with water.  Avoid your genital area.  Avoid any areas of skin that have broken skin, cuts, or scrapes.  Scrub your back and under your arms. Make sure to wash skin folds.  Let the lather sit on your skin for 1-2 minutes or as long as told by your health care provider.  Using a different clean, wet washcloth, thoroughly rinse your entire body. Make sure that all body creases and crevices are rinsed well.  Dry off with a clean towel. Do not put any substances on your body afterward--such as powder, lotion, or perfume--unless you are told to do so by your health care provider. Only use lotions that are recommended by the .  Put on clean clothes or pajamas.  If it is the night before your surgery, sleep in clean sheets.  How to use CHG prepackaged cloths  Only use CHG cloths as told by your health care provider, and follow the instructions on the label.  Use the CHG cloth on clean, dry skin.  Do not use the CHG cloth on your head or face unless your health care provider tells you to.  When washing with the CHG cloth:  Avoid your genital area.  Avoid any areas of skin that have broken skin, cuts, or scrapes.  Before surgery    Follow these steps when using a CHG cloth to clean before surgery (unless your health care provider gives you different instructions):  Using the CHG cloth, vigorously scrub the part of your body where you will be having surgery. Scrub using a back-and-forth motion for 3 minutes. The area on your body should be completely wet with CHG when you are done scrubbing.  Do not rinse. Discard the cloth and let the area air-dry. Do  not put any substances on the area afterward, such as powder, lotion, or perfume.  Put on clean clothes or pajamas.  If it is the night before your surgery, sleep in clean sheets.     For general bathing  Follow these steps when using CHG cloths for general bathing (unless your health care provider gives you different instructions).  Use a separate CHG cloth for each area of your body. Make sure you wash between any folds of skin and between your fingers and toes. Wash your body in the following order, switching to a new cloth after each step:  The front of your neck, shoulders, and chest.  Both of your arms, under your arms, and your hands.  Your stomach and groin area, avoiding the genitals.  Your right leg and foot.  Your left leg and foot.  The back of your neck, your back, and your buttocks.  Do not rinse. Discard the cloth and let the area air-dry. Do not put any substances on your body afterward--such as powder, lotion, or perfume--unless you are told to do so by your health care provider. Only use lotions that are recommended by the .  Put on clean clothes or pajamas.  Contact a health care provider if:  Your skin gets irritated after scrubbing.  You have questions about using your solution or cloth.  You swallow any chlorhexidine. Call your local poison control center (1-418.207.5677 in the [place]  Get help right away if:  Your eyes itch badly, or they become very red or swollen.  Your skin itches badly and is red or swollen.  Your hearing changes.  You have trouble seeing.  You have swelling or tingling in your mouth or throat.  You have trouble breathing.  These symptoms may represent a serious problem that is an emergency. Do not wait to see if the symptoms will go away. Get medical help right away. Call your local emergency services (873 in the [place] Do not drive yourself to the hospital.  Summary  Chlorhexidine gluconate (CHG) is a germ-killing (antiseptic) solution that is used to clean  the skin. Cleaning your skin with CHG may help to lower your risk for infection.  You may be given CHG to use for bathing. It may be in a bottle or in a prepackaged cloth to use on your skin. Carefully follow your health care provider's instructions and the instructions on the product label.  Do not use CHG if you have a chlorhexidine allergy.  Contact your health care provider if your skin gets irritated after scrubbing.  This information is not intended to replace advice given to you by your health care provider. Make sure you discuss any questions you have with your health care provider.  Document Revised: 04/17/2023 Document Reviewed: 02/28/2022  [employer] Patient Education © 2023 [employer]

## 2023-12-18 ENCOUNTER — ANESTHESIA (OUTPATIENT)
Dept: PERIOP | Facility: HOSPITAL | Age: 53
End: 2023-12-18
Payer: OTHER GOVERNMENT

## 2023-12-18 ENCOUNTER — ANESTHESIA EVENT (OUTPATIENT)
Dept: PERIOP | Facility: HOSPITAL | Age: 53
End: 2023-12-18
Payer: OTHER GOVERNMENT

## 2023-12-18 ENCOUNTER — APPOINTMENT (OUTPATIENT)
Dept: GENERAL RADIOLOGY | Facility: HOSPITAL | Age: 53
End: 2023-12-18
Payer: OTHER GOVERNMENT

## 2023-12-18 ENCOUNTER — HOSPITAL ENCOUNTER (OUTPATIENT)
Facility: HOSPITAL | Age: 53
Setting detail: HOSPITAL OUTPATIENT SURGERY
Discharge: HOME | End: 2023-12-18
Attending: ORTHOPAEDIC SURGERY | Admitting: ORTHOPAEDIC SURGERY
Payer: OTHER GOVERNMENT

## 2023-12-18 VITALS
DIASTOLIC BLOOD PRESSURE: 83 MMHG | TEMPERATURE: 97.2 F | HEART RATE: 68 BPM | RESPIRATION RATE: 16 BRPM | SYSTOLIC BLOOD PRESSURE: 148 MMHG | OXYGEN SATURATION: 99 %

## 2023-12-18 DIAGNOSIS — M77.12 LEFT LATERAL EPICONDYLITIS: Primary | ICD-10-CM

## 2023-12-18 PROCEDURE — 25010000002 FENTANYL CITRATE (PF) 50 MCG/ML SOLUTION: Performed by: ANESTHESIOLOGY

## 2023-12-18 PROCEDURE — 25010000002 CEFAZOLIN PER 500 MG: Performed by: ORTHOPAEDIC SURGERY

## 2023-12-18 PROCEDURE — 25010000002 ROPIVACAINE PER 1 MG: Performed by: ANESTHESIOLOGY

## 2023-12-18 PROCEDURE — C1713 ANCHOR/SCREW BN/BN,TIS/BN: HCPCS | Performed by: ORTHOPAEDIC SURGERY

## 2023-12-18 PROCEDURE — 25010000002 PROPOFOL 10 MG/ML EMULSION

## 2023-12-18 PROCEDURE — 25010000002 FENTANYL CITRATE (PF) 50 MCG/ML SOLUTION

## 2023-12-18 PROCEDURE — 25810000003 LACTATED RINGERS PER 1000 ML: Performed by: ORTHOPAEDIC SURGERY

## 2023-12-18 PROCEDURE — 25010000002 MIDAZOLAM PER 1 MG: Performed by: ANESTHESIOLOGY

## 2023-12-18 PROCEDURE — 25010000002 DEXAMETHASONE PER 1 MG

## 2023-12-18 DEVICE — IMPLANTABLE DEVICE: Type: IMPLANTABLE DEVICE | Site: ELBOW | Status: FUNCTIONAL

## 2023-12-18 RX ORDER — HYDROCODONE BITARTRATE AND ACETAMINOPHEN 5; 325 MG/1; MG/1
1 TABLET ORAL ONCE AS NEEDED
Status: DISCONTINUED | OUTPATIENT
Start: 2023-12-18 | End: 2023-12-18 | Stop reason: HOSPADM

## 2023-12-18 RX ORDER — ONDANSETRON 2 MG/ML
4 INJECTION INTRAMUSCULAR; INTRAVENOUS ONCE AS NEEDED
Status: DISCONTINUED | OUTPATIENT
Start: 2023-12-18 | End: 2023-12-18 | Stop reason: HOSPADM

## 2023-12-18 RX ORDER — SODIUM CHLORIDE 0.9 % (FLUSH) 0.9 %
3 SYRINGE (ML) INJECTION AS NEEDED
Status: DISCONTINUED | OUTPATIENT
Start: 2023-12-18 | End: 2023-12-18 | Stop reason: HOSPADM

## 2023-12-18 RX ORDER — FLUMAZENIL 0.1 MG/ML
0.2 INJECTION INTRAVENOUS AS NEEDED
Status: DISCONTINUED | OUTPATIENT
Start: 2023-12-18 | End: 2023-12-18 | Stop reason: HOSPADM

## 2023-12-18 RX ORDER — SODIUM CHLORIDE 0.9 % (FLUSH) 0.9 %
3-10 SYRINGE (ML) INJECTION AS NEEDED
Status: DISCONTINUED | OUTPATIENT
Start: 2023-12-18 | End: 2023-12-18 | Stop reason: HOSPADM

## 2023-12-18 RX ORDER — HYDROCODONE BITARTRATE AND ACETAMINOPHEN 5; 325 MG/1; MG/1
1 TABLET ORAL EVERY 4 HOURS PRN
Qty: 50 TABLET | Refills: 0 | Status: SHIPPED | OUTPATIENT
Start: 2023-12-18

## 2023-12-18 RX ORDER — ACETAMINOPHEN 500 MG
1000 TABLET ORAL ONCE
Status: COMPLETED | OUTPATIENT
Start: 2023-12-18 | End: 2023-12-18

## 2023-12-18 RX ORDER — KETAMINE HCL IN NACL, ISO-OSM 100MG/10ML
SYRINGE (ML) INJECTION AS NEEDED
Status: DISCONTINUED | OUTPATIENT
Start: 2023-12-18 | End: 2023-12-18 | Stop reason: SURG

## 2023-12-18 RX ORDER — ROPIVACAINE HYDROCHLORIDE 5 MG/ML
INJECTION, SOLUTION EPIDURAL; INFILTRATION; PERINEURAL
Status: COMPLETED | OUTPATIENT
Start: 2023-12-18 | End: 2023-12-18

## 2023-12-18 RX ORDER — DEXAMETHASONE SODIUM PHOSPHATE 4 MG/ML
INJECTION, SOLUTION INTRA-ARTICULAR; INTRALESIONAL; INTRAMUSCULAR; INTRAVENOUS; SOFT TISSUE AS NEEDED
Status: DISCONTINUED | OUTPATIENT
Start: 2023-12-18 | End: 2023-12-18 | Stop reason: SURG

## 2023-12-18 RX ORDER — DOCUSATE SODIUM 100 MG/1
100 CAPSULE, LIQUID FILLED ORAL 2 TIMES DAILY
Qty: 60 CAPSULE | Refills: 1 | Status: SHIPPED | OUTPATIENT
Start: 2023-12-18

## 2023-12-18 RX ORDER — LIDOCAINE HYDROCHLORIDE 10 MG/ML
0.5 INJECTION, SOLUTION EPIDURAL; INFILTRATION; INTRACAUDAL; PERINEURAL ONCE AS NEEDED
Status: DISCONTINUED | OUTPATIENT
Start: 2023-12-18 | End: 2023-12-18 | Stop reason: HOSPADM

## 2023-12-18 RX ORDER — NAPROXEN SODIUM 220 MG
220 TABLET ORAL 2 TIMES DAILY PRN
COMMUNITY
End: 2023-12-18 | Stop reason: HOSPADM

## 2023-12-18 RX ORDER — ONDANSETRON 4 MG/1
4 TABLET, FILM COATED ORAL EVERY 8 HOURS PRN
Qty: 20 TABLET | Refills: 1 | Status: SHIPPED | OUTPATIENT
Start: 2023-12-18

## 2023-12-18 RX ORDER — DROPERIDOL 2.5 MG/ML
0.62 INJECTION, SOLUTION INTRAMUSCULAR; INTRAVENOUS ONCE AS NEEDED
Status: DISCONTINUED | OUTPATIENT
Start: 2023-12-18 | End: 2023-12-18 | Stop reason: HOSPADM

## 2023-12-18 RX ORDER — FENTANYL CITRATE 50 UG/ML
INJECTION, SOLUTION INTRAMUSCULAR; INTRAVENOUS AS NEEDED
Status: DISCONTINUED | OUTPATIENT
Start: 2023-12-18 | End: 2023-12-18 | Stop reason: SURG

## 2023-12-18 RX ORDER — MIDAZOLAM HYDROCHLORIDE 1 MG/ML
2 INJECTION INTRAMUSCULAR; INTRAVENOUS ONCE
Status: COMPLETED | OUTPATIENT
Start: 2023-12-18 | End: 2023-12-18

## 2023-12-18 RX ORDER — SODIUM CHLORIDE, SODIUM LACTATE, POTASSIUM CHLORIDE, CALCIUM CHLORIDE 600; 310; 30; 20 MG/100ML; MG/100ML; MG/100ML; MG/100ML
100 INJECTION, SOLUTION INTRAVENOUS CONTINUOUS
Status: DISCONTINUED | OUTPATIENT
Start: 2023-12-18 | End: 2023-12-18 | Stop reason: HOSPADM

## 2023-12-18 RX ORDER — LIDOCAINE HYDROCHLORIDE 20 MG/ML
INJECTION, SOLUTION EPIDURAL; INFILTRATION; INTRACAUDAL; PERINEURAL AS NEEDED
Status: DISCONTINUED | OUTPATIENT
Start: 2023-12-18 | End: 2023-12-18 | Stop reason: SURG

## 2023-12-18 RX ORDER — PROPOFOL 10 MG/ML
VIAL (ML) INTRAVENOUS AS NEEDED
Status: DISCONTINUED | OUTPATIENT
Start: 2023-12-18 | End: 2023-12-18 | Stop reason: SURG

## 2023-12-18 RX ORDER — IBUPROFEN 600 MG/1
600 TABLET ORAL ONCE AS NEEDED
Status: COMPLETED | OUTPATIENT
Start: 2023-12-18 | End: 2023-12-18

## 2023-12-18 RX ORDER — ROCURONIUM BROMIDE 10 MG/ML
INJECTION, SOLUTION INTRAVENOUS AS NEEDED
Status: DISCONTINUED | OUTPATIENT
Start: 2023-12-18 | End: 2023-12-18 | Stop reason: SURG

## 2023-12-18 RX ORDER — CYCLOBENZAPRINE HCL 10 MG
10 TABLET ORAL 3 TIMES DAILY PRN
Qty: 30 TABLET | Refills: 0 | Status: SHIPPED | OUTPATIENT
Start: 2023-12-18

## 2023-12-18 RX ORDER — SODIUM CHLORIDE, SODIUM LACTATE, POTASSIUM CHLORIDE, CALCIUM CHLORIDE 600; 310; 30; 20 MG/100ML; MG/100ML; MG/100ML; MG/100ML
1000 INJECTION, SOLUTION INTRAVENOUS CONTINUOUS
Status: DISCONTINUED | OUTPATIENT
Start: 2023-12-18 | End: 2023-12-18 | Stop reason: HOSPADM

## 2023-12-18 RX ORDER — HYDROCODONE BITARTRATE AND ACETAMINOPHEN 10; 325 MG/1; MG/1
1 TABLET ORAL EVERY 4 HOURS PRN
Status: DISCONTINUED | OUTPATIENT
Start: 2023-12-18 | End: 2023-12-18 | Stop reason: HOSPADM

## 2023-12-18 RX ORDER — FENTANYL CITRATE 50 UG/ML
50 INJECTION, SOLUTION INTRAMUSCULAR; INTRAVENOUS ONCE
Status: COMPLETED | OUTPATIENT
Start: 2023-12-18 | End: 2023-12-18

## 2023-12-18 RX ORDER — SODIUM CHLORIDE 9 MG/ML
40 INJECTION, SOLUTION INTRAVENOUS AS NEEDED
Status: DISCONTINUED | OUTPATIENT
Start: 2023-12-18 | End: 2023-12-18 | Stop reason: HOSPADM

## 2023-12-18 RX ORDER — LABETALOL HYDROCHLORIDE 5 MG/ML
5 INJECTION, SOLUTION INTRAVENOUS
Status: DISCONTINUED | OUTPATIENT
Start: 2023-12-18 | End: 2023-12-18 | Stop reason: HOSPADM

## 2023-12-18 RX ORDER — FENTANYL CITRATE 50 UG/ML
25 INJECTION, SOLUTION INTRAMUSCULAR; INTRAVENOUS
Status: DISCONTINUED | OUTPATIENT
Start: 2023-12-18 | End: 2023-12-18 | Stop reason: HOSPADM

## 2023-12-18 RX ORDER — NEOSTIGMINE METHYLSULFATE 5 MG/5 ML
SYRINGE (ML) INTRAVENOUS AS NEEDED
Status: DISCONTINUED | OUTPATIENT
Start: 2023-12-18 | End: 2023-12-18 | Stop reason: SURG

## 2023-12-18 RX ORDER — ASPIRIN 81 MG/1
81 TABLET ORAL 2 TIMES DAILY
Qty: 42 TABLET | Refills: 0 | Status: SHIPPED | OUTPATIENT
Start: 2023-12-18 | End: 2024-01-08

## 2023-12-18 RX ORDER — MAGNESIUM HYDROXIDE 1200 MG/15ML
LIQUID ORAL AS NEEDED
Status: DISCONTINUED | OUTPATIENT
Start: 2023-12-18 | End: 2023-12-18 | Stop reason: HOSPADM

## 2023-12-18 RX ORDER — SODIUM CHLORIDE 0.9 % (FLUSH) 0.9 %
3 SYRINGE (ML) INJECTION EVERY 12 HOURS SCHEDULED
Status: DISCONTINUED | OUTPATIENT
Start: 2023-12-18 | End: 2023-12-18 | Stop reason: HOSPADM

## 2023-12-18 RX ORDER — NALOXONE HCL 0.4 MG/ML
0.4 VIAL (ML) INJECTION AS NEEDED
Status: DISCONTINUED | OUTPATIENT
Start: 2023-12-18 | End: 2023-12-18 | Stop reason: HOSPADM

## 2023-12-18 RX ADMIN — Medication 50 MG: at 07:28

## 2023-12-18 RX ADMIN — GLYCOPYRROLATE 0.4 MG: 0.2 INJECTION INTRAMUSCULAR; INTRAVENOUS at 08:02

## 2023-12-18 RX ADMIN — SODIUM CHLORIDE, POTASSIUM CHLORIDE, SODIUM LACTATE AND CALCIUM CHLORIDE 1000 ML: 600; 310; 30; 20 INJECTION, SOLUTION INTRAVENOUS at 06:09

## 2023-12-18 RX ADMIN — ROPIVACAINE HYDROCHLORIDE 20 ML: 5 INJECTION, SOLUTION EPIDURAL; INFILTRATION; PERINEURAL at 06:42

## 2023-12-18 RX ADMIN — SODIUM CHLORIDE 1000 MG: 900 INJECTION INTRAVENOUS at 07:17

## 2023-12-18 RX ADMIN — DEXAMETHASONE SODIUM PHOSPHATE 4 MG: 4 INJECTION, SOLUTION INTRA-ARTICULAR; INTRALESIONAL; INTRAMUSCULAR; INTRAVENOUS; SOFT TISSUE at 07:18

## 2023-12-18 RX ADMIN — PROPOFOL 200 MG: 10 INJECTION, EMULSION INTRAVENOUS at 07:10

## 2023-12-18 RX ADMIN — LIDOCAINE HYDROCHLORIDE 100 MG: 20 INJECTION, SOLUTION EPIDURAL; INFILTRATION; INTRACAUDAL; PERINEURAL at 07:10

## 2023-12-18 RX ADMIN — ROCURONIUM BROMIDE 50 MG: 10 SOLUTION INTRAVENOUS at 07:10

## 2023-12-18 RX ADMIN — FENTANYL CITRATE 25 MCG: 50 INJECTION, SOLUTION INTRAMUSCULAR; INTRAVENOUS at 09:02

## 2023-12-18 RX ADMIN — FENTANYL CITRATE 25 MCG: 50 INJECTION, SOLUTION INTRAMUSCULAR; INTRAVENOUS at 09:23

## 2023-12-18 RX ADMIN — FENTANYL CITRATE 25 MCG: 50 INJECTION, SOLUTION INTRAMUSCULAR; INTRAVENOUS at 09:07

## 2023-12-18 RX ADMIN — FENTANYL CITRATE 50 MCG: 50 INJECTION, SOLUTION INTRAMUSCULAR; INTRAVENOUS at 06:40

## 2023-12-18 RX ADMIN — MIDAZOLAM HYDROCHLORIDE 2 MG: 1 INJECTION, SOLUTION INTRAMUSCULAR; INTRAVENOUS at 06:42

## 2023-12-18 RX ADMIN — HYDROCODONE BITARTRATE AND ACETAMINOPHEN 1 TABLET: 10; 325 TABLET ORAL at 08:48

## 2023-12-18 RX ADMIN — IBUPROFEN 600 MG: 600 TABLET, FILM COATED ORAL at 08:50

## 2023-12-18 RX ADMIN — Medication 3 MG: at 08:02

## 2023-12-18 RX ADMIN — FENTANYL CITRATE 100 MCG: 50 INJECTION, SOLUTION INTRAMUSCULAR; INTRAVENOUS at 07:12

## 2023-12-18 RX ADMIN — ACETAMINOPHEN 1000 MG: 500 TABLET ORAL at 06:40

## 2023-12-18 RX ADMIN — FENTANYL CITRATE 25 MCG: 50 INJECTION, SOLUTION INTRAMUSCULAR; INTRAVENOUS at 09:18

## 2023-12-18 NOTE — H&P
Pt Name: Sami Lambert  MRN: [id number]  YOB: 1970  Date: 12/18/2023    HPI: Patient is a 53 y.o. year old male who presented to the outpatient clinic with continued lateral elbow pain and wrist weakness of the left arm.  He was ultimately diagnosed with lateral epicondylitis.  After conservative treatments including activity modification, physical therapy, NSAIDs and injection therapy failed to alleviate his symptoms, an MRI was obtained which demonstrated tearing of the ECRB at the common extensor origin over the lateral epicondyle.    Past Medical/Surgical History:   Past Medical History:   Diagnosis Date    Basal cell carcinoma     Headache     Hyperlipidemia     PTSD (post-traumatic stress disorder)     Sciatica     Sleep apnea      Past Surgical History:   Procedure Laterality Date    COLONOSCOPY      SKIN TAG REMOVAL      WISDOM TOOTH EXTRACTION       Social History:   Smoking:  none  Alcohol: occasional/infrequent    Medications:   Current Facility-Administered Medications   Medication Dose Route Frequency Provider Last Rate Last Admin    ceFAZolin 1000 mg IVPB in 100 mL NS (MBP)  1,000 mg Intravenous Once Cornelius Arndt MD        lactated ringers infusion 1,000 mL  1,000 mL Intravenous Continuous Cornelius Arndt MD 25 mL/hr at 12/18/23 0609 1,000 mL at 12/18/23 0609    lactated ringers infusion  100 mL/hr Intravenous Continuous Adrienne Cuevas MD        lidocaine PF 1% (XYLOCAINE) injection 0.5 mL  0.5 mL Intradermal Once PRN Cornelius Arndt MD        sodium chloride 0.9 % flush 3 mL  3 mL Intravenous PRN Cornelius Arndt MD        sodium chloride 0.9 % flush 3 mL  3 mL Intravenous Q12H Adrienne Cuevas MD        sodium chloride 0.9 % flush 3-10 mL  3-10 mL Intravenous PRN Adrienne Cuevas MD        sodium chloride 0.9 % infusion 40 mL  40 mL Intravenous PRN Adrienne Cuevas MD           Allergies:   Allergies   Allergen Reactions    Sulfa Antibiotics Rash       Review  of systems:     * Constitutional: negative     * HEENT: negative     * Skin: negative     * Cardiac: negative     * Respiratory: negative     * GI: negative     * : negative     * Neuro: negative     * CNS: negative     * Extremities: negative     * Endcrine: negative     Physical Exam:   /79   Pulse 63   Temp 97.4 °F (36.3 °C) (Temporal)   Resp 16   SpO2 99%     - General: normal development and appearance     - HEENT: normocephalic, ERIN     - Skin: pink, warm, normal tone     - Neck: supple, no masses,     - Chest: no rales or wheezes, normal expansion     - Heart: RRR, no murmurs/gallops     - Abdomen: soft, nondistended, nontender, normal sounds     - Vascular: normal pulses, color, tone     - Pysch/Neuro: normal affect, mood, alert and oriented     - Musculoskeletal: left elbow shows normal alignment without obvious deformity. Upon palpation over the common extensor origin of the lateral elbow, there is moderate tenderness. Decreased range of motion of the wrist secondary to pain. Good, but antalgic range of motion of all fingers. NVI distally.  Weakness with resisted supination, long digit extension and wrist extension.    Impression: Left lateral epicondylitis with common extensor/ECRB tearing    Surgical Plan: Left lateral elbow epicondyle common extensor repair.    Electronically signed by Cornelius Arndt MD on 12/18/2023 at 07:01 CST

## 2023-12-18 NOTE — ANESTHESIA PROCEDURE NOTES
Airway  Urgency: elective    Date/Time: 12/18/2023 7:12 AM  Airway not difficult    General Information and Staff    Patient location during procedure: OR  CRNA/CAA: Tone Proctor CRNA    Indications and Patient Condition    Preoxygenated: yes  Mask difficulty assessment: 1 - vent by mask    Final Airway Details  Final airway type: endotracheal airway      Successful airway: ETT  Cuffed: yes   Successful intubation technique: direct laryngoscopy  Endotracheal tube insertion site: oral  Blade: Kelley  Blade size: 2  ETT size (mm): 7.5  Cormack-Lehane Classification: grade I - full view of glottis  Placement verified by: chest auscultation   Cuff volume (mL): 5  Measured from: lips  ETT/EBT  to lips (cm): 22  Number of attempts at approach: 1  Assessment: lips, teeth, and gum same as pre-op and atraumatic intubation

## 2023-12-18 NOTE — ANESTHESIA POSTPROCEDURE EVALUATION
Patient: Sami Lambert    Procedure Summary       Date: 12/18/23 Room / Location: [facility] OR  /  [facility] OR    Anesthesia Start: 0707 Anesthesia Stop: 0827    Procedure: LEFT LATERAL ELBOW COMMON EXTENSOR TENDON REPAIR (Left: Elbow) Diagnosis: (M77.12)    Surgeons: Cornelius Arndt MD Provider: Tone Proctor CRNA    Anesthesia Type: general with block ASA Status: 2            Anesthesia Type: general with block    Vitals  Vitals Value Taken Time   /70 12/18/23 1002   Temp 97.2 °F (36.2 °C) 12/18/23 0951   Pulse 56 12/18/23 1009   Resp 16 12/18/23 1002   SpO2 97 % 12/18/23 1009   Vitals shown include unfiled device data.        Post Anesthesia Care and Evaluation    Patient location during evaluation: PACU  Patient participation: complete - patient participated  Level of consciousness: awake and alert  Pain management: adequate    Airway patency: patent  Anesthetic complications: No anesthetic complications    Cardiovascular status: acceptable  Respiratory status: acceptable  Hydration status: acceptable    Comments: Blood pressure 144/70, pulse 63, temperature 97.2 °F (36.2 °C), resp. rate 16, SpO2 97%.    Pt discharged from PACU based on dusyt score >8

## 2023-12-18 NOTE — ANESTHESIA PROCEDURE NOTES
Peripheral Block    Pre-sedation assessment completed: 12/18/2023 6:40 AM    Patient reassessed immediately prior to procedure    Patient location during procedure: pre-op  Start time: 12/18/2023 6:42 AM  Stop time: 12/18/2023 6:44 AM  Reason for block: procedure for pain, at surgeon's request, post-op pain management and Request by Dr. Arndt  Performed by  Anesthesiologist: Adrienne uCevas MD  Preanesthetic Checklist  Completed: patient identified, IV checked, site marked, risks and benefits discussed, surgical consent, monitors and equipment checked, pre-op evaluation and timeout performed  Prep:  Pt Position: supine  Sterile barriers:gloves  Prep: ChloraPrep  Patient monitoring: blood pressure monitoring, continuous pulse oximetry and EKG  Procedure    Sedation: yes    Guidance:ultrasound guided and Brachial plexus identified and local anesthetic seen surrounding nerves    ULTRASOUND INTERPRETATION.  Using ultrasound guidance a 20 G gauge needle was placed in close proximity to the nerve, at which point, under ultrasound guidance anesthetic was injected in the area of the nerve and spread of the anesthesia was seen on ultrasound in close proximity thereto.  There were no abnormalities seen on ultrasound; a digital image was taken; and the patient tolerated the procedure with no complications. Images:still images obtained (picture printed and placed in patients chart)    Laterality:left  Block Type:interscalene  Injection Technique:single-shot  Needle Type:echogenic  Needle Gauge:20 G      Medications Used: ropivacaine (NAROPIN) injection 0.5 % - Injection   20 mL - 12/18/2023 6:42:00 AM      Post Assessment  Injection Assessment: negative aspiration for heme, no paresthesia on injection and incremental injection  Patient Tolerance:comfortable throughout block  Complications:no

## 2023-12-18 NOTE — OP NOTE
OPERATIVE NOTE  Operative Report    Pt Name: Sami Lambert  MRN: [id number]  YOB: 1970  Date: 12/18/2023      PREOPERATIVE DIAGNOSIS: Left elbow chronic lateral epicondylitis.    POSTOPERATIVE DIAGNOSIS:  Left elbow chronic lateral epicondylitis.    PROCEDURE:  Procedure(s):  LEFT LATERAL ELBOW COMMON EXTENSOR TENDON REPAIR WITH PARTIAL LATERAL EPICONDYLECTOMY     SURGEON:  Cornelius Arndt MD    ASSISTANT:  None    ANESTHESIA:  Choice    ESTIMATED BLOOD LOSS:  < 10 mL    COMPLICATIONS:  None.    CONDITION:  Stable.    IMPLANTS:    Implant Name Type Inv. Item Serial No.  Lot No. LRB No. Used Action   SUT/ANCH FIBERTAK DX FW NO2 W/NDL - [id number] Implant SUT/ANCH FIBERTAK DX FW NO2 W/NDL  [employer] [id number] Left 1 Implanted       IMPLANTS: Arthrex 1.8 mm knotless FiberTak anchor      ANESTHESIA USED: General endotracheal anesthesia.        OPERATIVE INDICATIONS  The patient is a 53 y.o. male who has been seen in my clinic on multiple occasions in regards to Left lateral elbow pain consistent with lateral epicondylitis.  Multiple attempts at conservative treatment in the form of injections and physical therapy were made however the patient continued to have pain, weakness, and loss of function.  The patient wished to proceed with surgery understanding risks, benefits, and alternatives.  Risks included but were not limited to that of anesthesia, bleeding, infection, pain, damage to local structures (lateral antebrachial cutaneous nerve), need for further surgery.  We discussed failure to improve symptoms as well as the loss of function.      SPECIMENS: None.      DRAINS: None.      PROCEDURE IN DETAIL    The patient was seen in the preoperative holding room.  Once again, the informed consent form was reviewed with the patient and signed.  The site of surgery was marked with the patient's agreement.  The patient was transported to the operating room where a time out was performed identifying the  correct patient as well as the operative site.  IV Kefzol 2 g was given as perioperative antibiotics. The operative upper extremity was prepped and draped in usual sterile fashion.   Tourniquet was placed about the Left brachium inflated to 200 mmHg and total tourniquet time was less than 30 minutes.        An incision was made on the lateral aspect of the elbow.  Soft tissue was dissected to the level of the common extensor tendon as well as the lateral epicondyle identifying the ECRL anteriorly via its muscular belly. A midline radiocapitellar incision was made through the substance of the EDC identifying the underlying ECRB.  The degenerated tendon was then excised.  A rongeur was used to remove the prominence of the anterolateral aspect of the lateral epicondyle.  A repair of the common extensor tendon origin was then performed by initially placing an Arthrex 1.8 mm knotless FiberTak anchor in the prepared bonebed.  Using a free needle, the repair suture was then passed through the ECRL and ECRB in an inverted figure-of-eight fashion.  Once the repair has been appropriately tensioned, the repair suture is retrieved back through the anchor using the shuttling suture completing the repair.    The incision was irrigated followed by closure in layers.  The skin was closed with adhesive glue.  Sterile dressing was placed.  The patient was awakened from anesthesia after being placed to a well-padded long-arm splint.  The patient was transported to the recovery room in stable condition.      POSTOPERATIVE PLAN:   Discharge home with family.  Follow up in 2 weeks for a clinical check and beginning range of motion.

## 2023-12-18 NOTE — BRIEF OP NOTE
TENDON REPAIR EXTENSOR FOREARM / WRIST  Progress Note    Sami Lambert  12/18/2023    Pre-op Diagnosis:   M77.12       Post-Op Diagnosis Codes:  SAME    Procedure/CPT® Codes:  Procedure(s):  LEFT LATERAL ELBOW COMMON EXTENSOR TENDON REPAIR    Surgeon(s):  Cornelius Arndt MD    Anesthesia: Choice    Staff:   Circulator: Sophie Rodriguez RN  Scrub Person: Kenisha Sun; Cam Burgos  Vendor Representative: Remi Henderson     Estimated Blood Loss:  < 10 mL    Urine Voided: * No values recorded between 12/18/2023  7:06 AM and 12/18/2023  8:21 AM *    Specimens:                None    Drains: * No LDAs found *    Findings: Chronically torn and retracted ECRB in the setting pronounced angio fibroblastic dysplasia, left lateral elbow    Complications: None     was responsible for performing the following activities: Retraction, Suction, and Irrigation and their skilled assistance was necessary for the success of this case.    Cornelius Arndt MD     Date: 12/18/2023  Time: 08:38 CST

## 2023-12-18 NOTE — DISCHARGE INSTRUCTIONS
UPPER EXTREMITY POST-OP INSTRUCTIONS - DR. AVILA    POSTOP APPOINTMENT    At this appointment, you will be seen by the clinical staff for a wound check, dressing change, and you will be given the next phase in your post-operative protocol.    Post-op Care: Please follow these instructions closely!    Sling use: The sling is provided for your comfort and to ensure proper healing of your repair following surgery. Please  place the abduction pillow under your arm. Your surgery requires that you wear the sling:  __X__ For comfort.     IMPORTANT PHONE NUMBERS:   For emergencies, please call [phone]   You may reach Dr. Avila and clinical staff at [phone] M-F 8:00 am-5:00 pm   After 5pm or on the weekends, please call [phone] for the doctor on call.   Call immediately if you have any of the following symptoms:     Elevated temperature above 101 degrees for more than 48 hours after surgery     Persistent drainage from wound     Severe pain around surgical site     Any signs of infection    Dressings: Keep dressing/splint intact unless instructed otherwise below. SOME DRAINAGE IS NORMAL!     DO NOT touch or apply ointment to the incision.     DO NOT remove the steri-strips over the incisions (if you have steri-strips). They will         generally fall off on their own or can be removed 2 weeks after surgery.     If you have yellow gauze and it comes off, do not worry about it. Leave them off.    Signs of infection that warrant a phone call to our clinical line:     o Excessive drainage or redness     o Red streaking coming away from the incision  o Increased pain  o Increased temperature above 101 degrees    Sutures: If sutures are black, they will be removed at your postop visit. All others will dissolve on their own.    Bathing:  _X_Keep splint clean, dry, and intact. DO NOT place foreign objects into your splint.    Physical Therapy: Your physical therapy status will be discussed with you postoperatively and at your  first post-op appointment. Some injuries will not require physical therapy.    Medications: You will be discharged with the appropriate medications following your surgery. Fill these at the pharmacy and take them as directed on the label. Not all of the medications below may be prescribed. Occasionally, other medications may be prescribed with specific instructions.    Percocet/Lortab (oxycodone/hydrocodone with tylenol) - Pain Medication, will cause drowsiness     o Take 1-2 tablets every 4-6 hours. DO NOT EXCEED 4,000mg of Tylenol in 24 hours.  **DO NOT MIX WITH ALCOHOL, DRIVE WHILE TAKING, OR TAKE with extra TYLENOL**    Zofran (Ondansetron) or Phenergan - Anti-nausea medication, will cause drowsiness    **If you are running low on pain medications, please notify us if you need a refill 24-48 hours prior to when you run out, so we can make arrangements to refill the prescription for you if we determine is necessary    What to expect after a Nerve Block  Nerve blocks administered to block pain affect many types of nerves, including those nerves that control movement, pain, and normal sensation.  Following a nerve block, you may notice some bruising at the site where the block was given.  You may experience sensations such as:  numbness of the affected area or limb, tingling, heaviness (that is the limb feels heavy to you), weakness or inability to move the affected arm or leg, or a feeling as if your arm or leg has “fallen asleep”.    A nerve block can last from 9-18 hours depending on the medications used.  Certain medications can last up to 72 hours, your anesthesiologist may have discussed this with you pre-op. Usually the weakness wears off first followed by the tingling and heaviness.  As the block wears off, you may begin to notice pain; however, this sequence of events may occur in any order.  Typically, you will be able to move your limb before you will feel it.  Once a nerve block begins to wear off, the  effects are usually completely gone within 60 minutes.    If you experience continued side effects that you believe are block related, please call your healthcare provider.  Please see block-specific instructions below.      Instructions for any block involving the shoulder or arm  If you have had any kind of shoulder/arm block, you will go home with your arm in a sling.  Wear the sling until the block has completely worn off or as directed by your doctor.  You may be required to wear it for a longer period of time per your surgeon’s recommendations.  I you have had a shoulder/arm block; it is a good idea to sleep on a recliner with pillows under your arm.    Note:  If you have severe or prolonged shortness of breath, please seek medical assistance as soon as possible.    Protection of a “blocked” arm (limb)  After a nerve block, you cannot feel pain, pressure, or extremes of temperature in the affected limb.  And because of this, your blocked limb is at more risk for injury.  For example, it is possible to burn your limb on an extremely hot surface without feeling it.  When resting, it is important to reposition your limb periodically to avoid prolonged pressure on it.  This may require the use of pillows and padding.  While sleeping, you should avoid rolling onto the affected limb or putting too much pressure on it.  If you have a cast or tight dressing, check the color of your fingers of the affected limb.  Call your surgeon if they look discolored (that is, dusky, dark colored)  Use caution in cold weather.  Cover your limb appropriately to protect it from the cold.  Pain Management  Your surgeon will give you a prescription for pain medication.  Begin taking this before the nerve block wears off.  Bear in mind that sometimes the block can wear off in the middle of the night.

## 2023-12-18 NOTE — ANESTHESIA PREPROCEDURE EVALUATION
Anesthesia Evaluation     no history of anesthetic complications:   NPO Solid Status: > 8 hours  NPO Liquid Status: > 8 hours           Airway   Mallampati: II  No difficulty expected  Dental      Pulmonary    (+) ,sleep apnea  (-) not a smoker  Cardiovascular   Exercise tolerance: good (4-7 METS)    (-) hypertension, CAD      Neuro/Psych  (+) headaches, psychiatric history PTSD  (-) seizures, TIA, CVA  GI/Hepatic/Renal/Endo    (-) liver disease, no renal disease, diabetes    Musculoskeletal     Abdominal    Substance History      OB/GYN          Other                    Anesthesia Plan    ASA 2     general with block     intravenous induction     Anesthetic plan, risks, benefits, and alternatives have been provided, discussed and informed consent has been obtained with: patient.    CODE STATUS:

## 2024-02-16 ENCOUNTER — HOSPITAL ENCOUNTER (OUTPATIENT)
Dept: MRI IMAGING | Age: 54
Discharge: HOME | End: 2024-02-16
Payer: OTHER GOVERNMENT

## 2024-02-16 DIAGNOSIS — M25.521 RIGHT ELBOW PAIN: ICD-10-CM

## 2024-02-16 DIAGNOSIS — M19.021 OSTEOARTHRITIS OF RIGHT ELBOW, UNSPECIFIED OSTEOARTHRITIS TYPE: ICD-10-CM

## 2024-02-16 PROCEDURE — 73221 MRI JOINT UPR EXTREM W/O DYE: CPT

## 2024-04-01 ENCOUNTER — PRE-ADMISSION TESTING (OUTPATIENT)
Dept: PREADMISSION TESTING | Facility: HOSPITAL | Age: 54
End: 2024-04-01
Payer: OTHER GOVERNMENT

## 2024-04-01 VITALS
HEART RATE: 67 BPM | OXYGEN SATURATION: 98 % | WEIGHT: 172.84 LBS | SYSTOLIC BLOOD PRESSURE: 118 MMHG | DIASTOLIC BLOOD PRESSURE: 81 MMHG | HEIGHT: 68 IN | BODY MASS INDEX: 26.2 KG/M2 | RESPIRATION RATE: 16 BRPM

## 2024-04-01 LAB
DEPRECATED RDW RBC AUTO: 41.8 FL (ref 37–54)
ERYTHROCYTE [DISTWIDTH] IN BLOOD BY AUTOMATED COUNT: 13 % (ref 12.3–15.4)
HCT VFR BLD AUTO: 43.4 % (ref 37.5–51)
HGB BLD-MCNC: 14.1 G/DL (ref 13–17.7)
MCH RBC QN AUTO: 28.6 PG (ref 26.6–33)
MCHC RBC AUTO-ENTMCNC: 32.5 G/DL (ref 31.5–35.7)
MCV RBC AUTO: 88 FL (ref 79–97)
PLATELET # BLD AUTO: 264 10*3/MM3 (ref 140–450)
PMV BLD AUTO: 9.7 FL (ref 6–12)
RBC # BLD AUTO: 4.93 10*6/MM3 (ref 4.14–5.8)
WBC NRBC COR # BLD AUTO: 5.9 10*3/MM3 (ref 3.4–10.8)

## 2024-04-01 PROCEDURE — 85027 COMPLETE CBC AUTOMATED: CPT

## 2024-04-01 PROCEDURE — 36415 COLL VENOUS BLD VENIPUNCTURE: CPT

## 2024-04-01 NOTE — DISCHARGE INSTRUCTIONS
Preparing for Surgery  Follow these instructions before the procedure:  Several days or weeks before your procedure  Ask your health care provider about:  Changing or stopping your regular medicines. This is especially important if you are taking diabetes medicines or blood thinners.  Taking medicines such as aspirin and ibuprofen. These medicines can thin your blood. Do not take these medicines unless your health care provider tells you to take them.  Taking over-the-counter medicines, vitamins, herbs, and supplements.    Contact your surgeon if you:  Develop a fever of more than 100.4°F (38°C) or other feelings of illness during the 48 hours before your surgery.  Have symptoms that get worse.  Have questions or concerns about your surgery.  If you are going home the same day of your surgery you will need to arrange for a responsible adult, age 18 years old or older, to drive you home from the hospital and stay with you for 24 hours. Verification of the  will be made prior to any procedure requiring sedation. You may not go home in a taxi or any form of public transportation by yourself.     Day before your procedure  24 hours before your procedure DO NOT drink alcoholic beverages or smoke.  24 hours before your procedure STOP taking Erectile Dysfunction medication (i.e.,Cialis, Viagra)   You may be asked to shower with a germ-killing soap.  Day of your procedure   You may take the following medication(s) the morning of surgery with a sip of water: none unless otherwise specified by Dr. Arndt      8 hours before your procedure STOP all food, any dairy products, and full liquids. This includes hard candy, chewing gum or mints. This is extremely important to prevent serious complications.   Up to 2 hours before your scheduled arrival time, you may have clear liquids no cream, powder, or pulp of any kind. Safe options are water, black coffee, plain tea, soda, Gatorade/Powerade, clear broth, apple juice.  2 hours  before your scheduled arrival time, STOP drinking clear liquids.  You may need to take another shower with a germ-killing soap before you leave home in the morning. Do not use perfumes, colognes, or body lotions.  Wear comfortable loose-fitting clothing.  Remove all jewelry including body piercing and rings, dark colored nail polish, and make up prior to arrival at the hospital. Leave all valuables at home.   Bring your hearing aids if you rely on them.  Do not wear contact lenses. If you wear eyeglasses remember to bring a case to store them in while you are in surgery.  Do not use denture adhesives since you will be asked to remove them during your surgery.    You do not need to bring your home medications into the hospital.   Bring your sleep apnea device with you on the day of your surgery (if this applies to you).  If you wear portable oxygen, bring it with you.   If you are staying overnight, you may bring a bag of items you may need such as slippers, robe and a change of clothes for your discharge. You may want to leave these items in the car until you are ready for them since your family will take your belongings when you leave the pre-operative area.  Arrive at the hospital as scheduled by the office. You will be asked to arrive 2 hours prior to your surgery time in order to prepare for your procedure.  When you arrive at the hospital  Go to the registration desk located at the main entrance of the hospital.  After registration is completed, you will be given a beeper and a sticker sheet. Take the stickers to Outpatient Surgery and place in the tray at the end of the desk to notify the staff that you have arrived and registered.   Return to the lobby to wait. You are not always called back according to the time of arrival but rather the time your doctor will be ready.  When your beeper lights up and vibrates proceed through the double doors, under the stairs, and a member of the Outpatient Surgery staff  will escort you to your preoperative room.       How to Use Chlorhexidine Before Surgery  Chlorhexidine gluconate (CHG) is a germ-killing (antiseptic) solution that is used to clean the skin. It can get rid of the bacteria that normally live on the skin and can keep them away for about 24 hours. To clean your skin with CHG, you may be given:  A CHG solution to use in the shower or as part of a sponge bath.  A prepackaged cloth that contains CHG.  Cleaning your skin with CHG may help lower the risk for infection:  While you are staying in the intensive care unit of the hospital.  If you have a vascular access, such as a central line, to provide short-term or long-term access to your veins.  If you have a catheter to drain urine from your bladder.  If you are on a ventilator. A ventilator is a machine that helps you breathe by moving air in and out of your lungs.  After surgery.  What are the risks?  Risks of using CHG include:  A skin reaction.  Hearing loss, if CHG gets in your ears and you have a perforated eardrum.  Eye injury, if CHG gets in your eyes and is not rinsed out.  The CHG product catching fire.  Make sure that you avoid smoking and flames after applying CHG to your skin.  Do not use CHG:  If you have a chlorhexidine allergy or have previously reacted to chlorhexidine.  On babies younger than 2 months of age.  How to use CHG solution  Use CHG only as told by your health care provider, and follow the instructions on the label.  Use the full amount of CHG as directed. Usually, this is one bottle.  During a shower    Follow these steps when using CHG solution during a shower (unless your health care provider gives you different instructions):  Start the shower.  Use your normal soap and shampoo to wash your face and hair.  Turn off the shower or move out of the shower stream.  Pour the CHG onto a clean washcloth. Do not use any type of brush or rough-edged sponge.  Starting at your neck, lather your body  down to your toes. Make sure you follow these instructions:  If you will be having surgery, pay special attention to the part of your body where you will be having surgery. Scrub this area for at least 1 minute.  Do not use CHG on your head or face. If the solution gets into your ears or eyes, rinse them well with water.  Avoid your genital area.  Avoid any areas of skin that have broken skin, cuts, or scrapes.  Scrub your back and under your arms. Make sure to wash skin folds.  Let the lather sit on your skin for 1-2 minutes or as long as told by your health care provider.  Thoroughly rinse your entire body in the shower. Make sure that all body creases and crevices are rinsed well.  Dry off with a clean towel. Do not put any substances on your body afterward--such as powder, lotion, or perfume--unless you are told to do so by your health care provider. Only use lotions that are recommended by the .  Put on clean clothes or pajamas.  If it is the night before your surgery, sleep in clean sheets.     During a sponge bath  Follow these steps when using CHG solution during a sponge bath (unless your health care provider gives you different instructions):  Use your normal soap and shampoo to wash your face and hair.  Pour the CHG onto a clean washcloth.  Starting at your neck, lather your body down to your toes. Make sure you follow these instructions:  If you will be having surgery, pay special attention to the part of your body where you will be having surgery. Scrub this area for at least 1 minute.  Do not use CHG on your head or face. If the solution gets into your ears or eyes, rinse them well with water.  Avoid your genital area.  Avoid any areas of skin that have broken skin, cuts, or scrapes.  Scrub your back and under your arms. Make sure to wash skin folds.  Let the lather sit on your skin for 1-2 minutes or as long as told by your health care provider.  Using a different clean, wet washcloth,  thoroughly rinse your entire body. Make sure that all body creases and crevices are rinsed well.  Dry off with a clean towel. Do not put any substances on your body afterward--such as powder, lotion, or perfume--unless you are told to do so by your health care provider. Only use lotions that are recommended by the .  Put on clean clothes or pajamas.  If it is the night before your surgery, sleep in clean sheets.  How to use CHG prepackaged cloths  Only use CHG cloths as told by your health care provider, and follow the instructions on the label.  Use the CHG cloth on clean, dry skin.  Do not use the CHG cloth on your head or face unless your health care provider tells you to.  When washing with the CHG cloth:  Avoid your genital area.  Avoid any areas of skin that have broken skin, cuts, or scrapes.  Before surgery    Follow these steps when using a CHG cloth to clean before surgery (unless your health care provider gives you different instructions):  Using the CHG cloth, vigorously scrub the part of your body where you will be having surgery. Scrub using a back-and-forth motion for 3 minutes. The area on your body should be completely wet with CHG when you are done scrubbing.  Do not rinse. Discard the cloth and let the area air-dry. Do not put any substances on the area afterward, such as powder, lotion, or perfume.  Put on clean clothes or pajamas.  If it is the night before your surgery, sleep in clean sheets.     For general bathing  Follow these steps when using CHG cloths for general bathing (unless your health care provider gives you different instructions).  Use a separate CHG cloth for each area of your body. Make sure you wash between any folds of skin and between your fingers and toes. Wash your body in the following order, switching to a new cloth after each step:  The front of your neck, shoulders, and chest.  Both of your arms, under your arms, and your hands.  Your stomach and groin area,  avoiding the genitals.  Your right leg and foot.  Your left leg and foot.  The back of your neck, your back, and your buttocks.  Do not rinse. Discard the cloth and let the area air-dry. Do not put any substances on your body afterward--such as powder, lotion, or perfume--unless you are told to do so by your health care provider. Only use lotions that are recommended by the .  Put on clean clothes or pajamas.  Contact a health care provider if:  Your skin gets irritated after scrubbing.  You have questions about using your solution or cloth.  You swallow any chlorhexidine. Call your local poison control center (1-252.103.4952 in the [place]  Get help right away if:  Your eyes itch badly, or they become very red or swollen.  Your skin itches badly and is red or swollen.  Your hearing changes.  You have trouble seeing.  You have swelling or tingling in your mouth or throat.  You have trouble breathing.  These symptoms may represent a serious problem that is an emergency. Do not wait to see if the symptoms will go away. Get medical help right away. Call your local emergency services (867 in the [place] Do not drive yourself to the hospital.  Summary  Chlorhexidine gluconate (CHG) is a germ-killing (antiseptic) solution that is used to clean the skin. Cleaning your skin with CHG may help to lower your risk for infection.  You may be given CHG to use for bathing. It may be in a bottle or in a prepackaged cloth to use on your skin. Carefully follow your health care provider's instructions and the instructions on the product label.  Do not use CHG if you have a chlorhexidine allergy.  Contact your health care provider if your skin gets irritated after scrubbing.  This information is not intended to replace advice given to you by your health care provider. Make sure you discuss any questions you have with your health care provider.  Document Revised: 04/17/2023 Document Reviewed: 02/28/2022  Elsevier Patient  Education © 2023 [employer]

## 2024-04-15 ENCOUNTER — ANESTHESIA EVENT (OUTPATIENT)
Dept: PERIOP | Facility: HOSPITAL | Age: 54
End: 2024-04-15
Payer: OTHER GOVERNMENT

## 2024-04-15 ENCOUNTER — ANESTHESIA (OUTPATIENT)
Dept: PERIOP | Facility: HOSPITAL | Age: 54
End: 2024-04-15
Payer: OTHER GOVERNMENT

## 2024-04-15 ENCOUNTER — HOSPITAL ENCOUNTER (OUTPATIENT)
Facility: HOSPITAL | Age: 54
Setting detail: HOSPITAL OUTPATIENT SURGERY
Discharge: HOME | End: 2024-04-15
Attending: ORTHOPAEDIC SURGERY | Admitting: ORTHOPAEDIC SURGERY
Payer: OTHER GOVERNMENT

## 2024-04-15 VITALS
RESPIRATION RATE: 16 BRPM | SYSTOLIC BLOOD PRESSURE: 122 MMHG | OXYGEN SATURATION: 100 % | TEMPERATURE: 97.5 F | DIASTOLIC BLOOD PRESSURE: 94 MMHG | HEART RATE: 66 BPM

## 2024-04-15 DIAGNOSIS — M77.12 LEFT LATERAL EPICONDYLITIS: ICD-10-CM

## 2024-04-15 PROBLEM — M77.11 RIGHT LATERAL EPICONDYLITIS: Status: ACTIVE | Noted: 2024-04-15

## 2024-04-15 LAB
ANION GAP SERPL CALCULATED.3IONS-SCNC: 10 MMOL/L (ref 5–15)
BUN SERPL-MCNC: 16 MG/DL (ref 6–20)
BUN/CREAT SERPL: 19.3 (ref 7–25)
CALCIUM SPEC-SCNC: 9.3 MG/DL (ref 8.6–10.5)
CHLORIDE SERPL-SCNC: 104 MMOL/L (ref 98–107)
CO2 SERPL-SCNC: 26 MMOL/L (ref 22–29)
CREAT SERPL-MCNC: 0.83 MG/DL (ref 0.76–1.27)
EGFRCR SERPLBLD CKD-EPI 2021: 103.4 ML/MIN/1.73
GLUCOSE SERPL-MCNC: 94 MG/DL (ref 65–99)
POTASSIUM SERPL-SCNC: 4.1 MMOL/L (ref 3.5–5.2)
SODIUM SERPL-SCNC: 140 MMOL/L (ref 136–145)

## 2024-04-15 PROCEDURE — 25010000002 FENTANYL CITRATE (PF) 50 MCG/ML SOLUTION: Performed by: ANESTHESIOLOGY

## 2024-04-15 PROCEDURE — 25010000002 CEFAZOLIN PER 500 MG: Performed by: ORTHOPAEDIC SURGERY

## 2024-04-15 PROCEDURE — 25010000002 ROPIVACAINE PER 1 MG: Performed by: ANESTHESIOLOGY

## 2024-04-15 PROCEDURE — 25010000002 ONDANSETRON PER 1 MG: Performed by: NURSE ANESTHETIST, CERTIFIED REGISTERED

## 2024-04-15 PROCEDURE — 25010000002 PROPOFOL 10 MG/ML EMULSION: Performed by: NURSE ANESTHETIST, CERTIFIED REGISTERED

## 2024-04-15 PROCEDURE — 25810000003 LACTATED RINGERS PER 1000 ML: Performed by: ORTHOPAEDIC SURGERY

## 2024-04-15 PROCEDURE — 80048 BASIC METABOLIC PNL TOTAL CA: CPT | Performed by: ORTHOPAEDIC SURGERY

## 2024-04-15 PROCEDURE — 25010000002 MIDAZOLAM PER 1 MG: Performed by: ANESTHESIOLOGY

## 2024-04-15 PROCEDURE — 25010000002 DEXAMETHASONE PER 1 MG: Performed by: NURSE ANESTHETIST, CERTIFIED REGISTERED

## 2024-04-15 PROCEDURE — 25010000002 FENTANYL CITRATE (PF) 50 MCG/ML SOLUTION: Performed by: NURSE ANESTHETIST, CERTIFIED REGISTERED

## 2024-04-15 RX ORDER — DROPERIDOL 2.5 MG/ML
0.62 INJECTION, SOLUTION INTRAMUSCULAR; INTRAVENOUS ONCE AS NEEDED
Status: DISCONTINUED | OUTPATIENT
Start: 2024-04-15 | End: 2024-04-15 | Stop reason: HOSPADM

## 2024-04-15 RX ORDER — FENTANYL CITRATE 50 UG/ML
INJECTION, SOLUTION INTRAMUSCULAR; INTRAVENOUS AS NEEDED
Status: DISCONTINUED | OUTPATIENT
Start: 2024-04-15 | End: 2024-04-15 | Stop reason: SURG

## 2024-04-15 RX ORDER — LABETALOL HYDROCHLORIDE 5 MG/ML
5 INJECTION, SOLUTION INTRAVENOUS
Status: DISCONTINUED | OUTPATIENT
Start: 2024-04-15 | End: 2024-04-15 | Stop reason: HOSPADM

## 2024-04-15 RX ORDER — NALOXONE HYDROCHLORIDE 4 MG/.1ML
SPRAY NASAL
Qty: 2 EACH | Refills: 0 | Status: SHIPPED | OUTPATIENT
Start: 2024-04-15

## 2024-04-15 RX ORDER — NALOXONE HCL 0.4 MG/ML
0.4 VIAL (ML) INJECTION AS NEEDED
Status: DISCONTINUED | OUTPATIENT
Start: 2024-04-15 | End: 2024-04-15 | Stop reason: HOSPADM

## 2024-04-15 RX ORDER — LIDOCAINE HYDROCHLORIDE 20 MG/ML
INJECTION, SOLUTION EPIDURAL; INFILTRATION; INTRACAUDAL; PERINEURAL AS NEEDED
Status: DISCONTINUED | OUTPATIENT
Start: 2024-04-15 | End: 2024-04-15 | Stop reason: SURG

## 2024-04-15 RX ORDER — ACETAMINOPHEN 500 MG
1000 TABLET ORAL ONCE
Status: COMPLETED | OUTPATIENT
Start: 2024-04-15 | End: 2024-04-15

## 2024-04-15 RX ORDER — SODIUM CHLORIDE 0.9 % (FLUSH) 0.9 %
3 SYRINGE (ML) INJECTION EVERY 12 HOURS SCHEDULED
Status: DISCONTINUED | OUTPATIENT
Start: 2024-04-15 | End: 2024-04-15 | Stop reason: HOSPADM

## 2024-04-15 RX ORDER — LIDOCAINE HYDROCHLORIDE 10 MG/ML
0.5 INJECTION, SOLUTION EPIDURAL; INFILTRATION; INTRACAUDAL; PERINEURAL ONCE AS NEEDED
Status: DISCONTINUED | OUTPATIENT
Start: 2024-04-15 | End: 2024-04-15 | Stop reason: HOSPADM

## 2024-04-15 RX ORDER — HYDROCODONE BITARTRATE AND ACETAMINOPHEN 5; 325 MG/1; MG/1
1 TABLET ORAL EVERY 4 HOURS PRN
Qty: 50 TABLET | Refills: 0 | Status: SHIPPED | OUTPATIENT
Start: 2024-04-15

## 2024-04-15 RX ORDER — FEXOFENADINE HCL AND PSEUDOEPHEDRINE HCI 180; 240 MG/1; MG/1
1 TABLET, EXTENDED RELEASE ORAL DAILY
COMMUNITY

## 2024-04-15 RX ORDER — HYDROCODONE BITARTRATE AND ACETAMINOPHEN 10; 325 MG/1; MG/1
1 TABLET ORAL EVERY 4 HOURS PRN
Status: DISCONTINUED | OUTPATIENT
Start: 2024-04-15 | End: 2024-04-15 | Stop reason: HOSPADM

## 2024-04-15 RX ORDER — ONDANSETRON 2 MG/ML
4 INJECTION INTRAMUSCULAR; INTRAVENOUS ONCE AS NEEDED
Status: DISCONTINUED | OUTPATIENT
Start: 2024-04-15 | End: 2024-04-15 | Stop reason: HOSPADM

## 2024-04-15 RX ORDER — ONDANSETRON 4 MG/1
4 TABLET, FILM COATED ORAL EVERY 8 HOURS PRN
Qty: 20 TABLET | Refills: 1 | Status: SHIPPED | OUTPATIENT
Start: 2024-04-15

## 2024-04-15 RX ORDER — HYDROCODONE BITARTRATE AND ACETAMINOPHEN 5; 325 MG/1; MG/1
1 TABLET ORAL EVERY 4 HOURS PRN
Status: DISCONTINUED | OUTPATIENT
Start: 2024-04-15 | End: 2024-04-15 | Stop reason: HOSPADM

## 2024-04-15 RX ORDER — PROPOFOL 10 MG/ML
VIAL (ML) INTRAVENOUS AS NEEDED
Status: DISCONTINUED | OUTPATIENT
Start: 2024-04-15 | End: 2024-04-15 | Stop reason: SURG

## 2024-04-15 RX ORDER — SODIUM CHLORIDE 0.9 % (FLUSH) 0.9 %
3 SYRINGE (ML) INJECTION AS NEEDED
Status: DISCONTINUED | OUTPATIENT
Start: 2024-04-15 | End: 2024-04-15 | Stop reason: HOSPADM

## 2024-04-15 RX ORDER — SODIUM CHLORIDE 0.9 % (FLUSH) 0.9 %
3-10 SYRINGE (ML) INJECTION AS NEEDED
Status: DISCONTINUED | OUTPATIENT
Start: 2024-04-15 | End: 2024-04-15 | Stop reason: HOSPADM

## 2024-04-15 RX ORDER — SODIUM CHLORIDE 9 MG/ML
40 INJECTION, SOLUTION INTRAVENOUS AS NEEDED
Status: DISCONTINUED | OUTPATIENT
Start: 2024-04-15 | End: 2024-04-15 | Stop reason: HOSPADM

## 2024-04-15 RX ORDER — FLUMAZENIL 0.1 MG/ML
0.2 INJECTION INTRAVENOUS AS NEEDED
Status: DISCONTINUED | OUTPATIENT
Start: 2024-04-15 | End: 2024-04-15 | Stop reason: HOSPADM

## 2024-04-15 RX ORDER — CYCLOBENZAPRINE HCL 10 MG
10 TABLET ORAL 3 TIMES DAILY PRN
Qty: 30 TABLET | Refills: 0 | Status: SHIPPED | OUTPATIENT
Start: 2024-04-15

## 2024-04-15 RX ORDER — DEXAMETHASONE SODIUM PHOSPHATE 4 MG/ML
INJECTION, SOLUTION INTRA-ARTICULAR; INTRALESIONAL; INTRAMUSCULAR; INTRAVENOUS; SOFT TISSUE AS NEEDED
Status: DISCONTINUED | OUTPATIENT
Start: 2024-04-15 | End: 2024-04-15 | Stop reason: SURG

## 2024-04-15 RX ORDER — SODIUM CHLORIDE, SODIUM LACTATE, POTASSIUM CHLORIDE, CALCIUM CHLORIDE 600; 310; 30; 20 MG/100ML; MG/100ML; MG/100ML; MG/100ML
1000 INJECTION, SOLUTION INTRAVENOUS CONTINUOUS
Status: DISCONTINUED | OUTPATIENT
Start: 2024-04-15 | End: 2024-04-15 | Stop reason: HOSPADM

## 2024-04-15 RX ORDER — FENTANYL CITRATE 50 UG/ML
50 INJECTION, SOLUTION INTRAMUSCULAR; INTRAVENOUS ONCE
Status: COMPLETED | OUTPATIENT
Start: 2024-04-15 | End: 2024-04-15

## 2024-04-15 RX ORDER — LIDOCAINE HYDROCHLORIDE 20 MG/ML
JELLY TOPICAL AS NEEDED
Status: DISCONTINUED | OUTPATIENT
Start: 2024-04-15 | End: 2024-04-15

## 2024-04-15 RX ORDER — DOCUSATE SODIUM 100 MG/1
100 CAPSULE, LIQUID FILLED ORAL 2 TIMES DAILY
Qty: 60 CAPSULE | Refills: 1 | Status: SHIPPED | OUTPATIENT
Start: 2024-04-15

## 2024-04-15 RX ORDER — SODIUM CHLORIDE, SODIUM LACTATE, POTASSIUM CHLORIDE, CALCIUM CHLORIDE 600; 310; 30; 20 MG/100ML; MG/100ML; MG/100ML; MG/100ML
100 INJECTION, SOLUTION INTRAVENOUS CONTINUOUS
Status: DISCONTINUED | OUTPATIENT
Start: 2024-04-15 | End: 2024-04-15 | Stop reason: HOSPADM

## 2024-04-15 RX ORDER — IBUPROFEN 600 MG/1
600 TABLET ORAL EVERY 6 HOURS PRN
Status: DISCONTINUED | OUTPATIENT
Start: 2024-04-15 | End: 2024-04-15 | Stop reason: HOSPADM

## 2024-04-15 RX ORDER — FENTANYL CITRATE 50 UG/ML
50 INJECTION, SOLUTION INTRAMUSCULAR; INTRAVENOUS
Status: DISCONTINUED | OUTPATIENT
Start: 2024-04-15 | End: 2024-04-15 | Stop reason: HOSPADM

## 2024-04-15 RX ORDER — ONDANSETRON 2 MG/ML
INJECTION INTRAMUSCULAR; INTRAVENOUS AS NEEDED
Status: DISCONTINUED | OUTPATIENT
Start: 2024-04-15 | End: 2024-04-15 | Stop reason: SURG

## 2024-04-15 RX ORDER — MIDAZOLAM HYDROCHLORIDE 1 MG/ML
2 INJECTION INTRAMUSCULAR; INTRAVENOUS ONCE
Status: COMPLETED | OUTPATIENT
Start: 2024-04-15 | End: 2024-04-15

## 2024-04-15 RX ORDER — MAGNESIUM HYDROXIDE 1200 MG/15ML
LIQUID ORAL AS NEEDED
Status: DISCONTINUED | OUTPATIENT
Start: 2024-04-15 | End: 2024-04-15 | Stop reason: HOSPADM

## 2024-04-15 RX ORDER — ROPIVACAINE HYDROCHLORIDE 5 MG/ML
INJECTION, SOLUTION EPIDURAL; INFILTRATION; PERINEURAL
Status: COMPLETED | OUTPATIENT
Start: 2024-04-15 | End: 2024-04-15

## 2024-04-15 RX ORDER — ASPIRIN 81 MG/1
81 TABLET ORAL 2 TIMES DAILY
Qty: 42 TABLET | Refills: 0 | Status: SHIPPED | OUTPATIENT
Start: 2024-04-15 | End: 2024-05-06

## 2024-04-15 RX ADMIN — ROPIVACAINE HYDROCHLORIDE 20 ML: 5 INJECTION, SOLUTION EPIDURAL; INFILTRATION; PERINEURAL at 09:03

## 2024-04-15 RX ADMIN — LIDOCAINE HYDROCHLORIDE 100 MG: 20 INJECTION, SOLUTION EPIDURAL; INFILTRATION; INTRACAUDAL; PERINEURAL at 10:13

## 2024-04-15 RX ADMIN — ONDANSETRON 4 MG: 2 INJECTION INTRAMUSCULAR; INTRAVENOUS at 10:52

## 2024-04-15 RX ADMIN — FENTANYL CITRATE 100 MCG: 50 INJECTION, SOLUTION INTRAMUSCULAR; INTRAVENOUS at 10:13

## 2024-04-15 RX ADMIN — SODIUM CHLORIDE, POTASSIUM CHLORIDE, SODIUM LACTATE AND CALCIUM CHLORIDE 1000 ML: 600; 310; 30; 20 INJECTION, SOLUTION INTRAVENOUS at 08:56

## 2024-04-15 RX ADMIN — PROPOFOL INJECTABLE EMULSION 200 MG: 10 INJECTION, EMULSION INTRAVENOUS at 10:14

## 2024-04-15 RX ADMIN — FENTANYL CITRATE 50 MCG: 50 INJECTION, SOLUTION INTRAMUSCULAR; INTRAVENOUS at 09:00

## 2024-04-15 RX ADMIN — CEFAZOLIN 1 G: 1 INJECTION, POWDER, FOR SOLUTION INTRAMUSCULAR; INTRAVENOUS at 10:17

## 2024-04-15 RX ADMIN — MIDAZOLAM HYDROCHLORIDE 2 MG: 1 INJECTION, SOLUTION INTRAMUSCULAR; INTRAVENOUS at 08:59

## 2024-04-15 RX ADMIN — DEXAMETHASONE SODIUM PHOSPHATE 4 MG: 4 INJECTION, SOLUTION INTRA-ARTICULAR; INTRALESIONAL; INTRAMUSCULAR; INTRAVENOUS; SOFT TISSUE at 10:34

## 2024-04-15 RX ADMIN — ACETAMINOPHEN 1000 MG: 500 TABLET, FILM COATED ORAL at 09:07

## 2024-04-15 NOTE — ANESTHESIA PROCEDURE NOTES
Peripheral Block    Pre-sedation assessment completed: 4/15/2024 8:57 AM    Patient reassessed immediately prior to procedure    Patient location during procedure: pre-op  Start time: 4/15/2024 9:02 AM  Stop time: 4/15/2024 9:03 AM  Reason for block: procedure for pain, at surgeon's request, post-op pain management and Request by Dr. Arndt  Performed by  Anesthesiologist: Adrienne Cuevas MD  Preanesthetic Checklist  Completed: patient identified, IV checked, site marked, risks and benefits discussed, surgical consent, monitors and equipment checked, pre-op evaluation and timeout performed  Prep:  Pt Position: supine  Sterile barriers:gloves  Prep: ChloraPrep  Patient monitoring: blood pressure monitoring, continuous pulse oximetry and EKG  Procedure    Sedation: yes  Performed under: local infiltration  Guidance:ultrasound guided and Brachial plexus identified and local anesthetic seen surrounding nerves    ULTRASOUND INTERPRETATION.  Using ultrasound guidance a 20 G gauge needle was placed in close proximity to the nerve, at which point, under ultrasound guidance anesthetic was injected in the area of the nerve and spread of the anesthesia was seen on ultrasound in close proximity thereto.  There were no abnormalities seen on ultrasound; a digital image was taken; and the patient tolerated the procedure with no complications. Images:still images obtained (picture printed and placed in patients chart)    Laterality:right  Block Type:interscalene  Injection Technique:single-shot  Needle Type:echogenic  Needle Gauge:20 G      Medications Used: ropivacaine (NAROPIN) injection 0.5 % - Injection   20 mL - 4/15/2024 9:03:00 AM      Post Assessment  Injection Assessment: negative aspiration for heme, no paresthesia on injection and incremental injection  Patient Tolerance:comfortable throughout block  Complications:no

## 2024-04-15 NOTE — BRIEF OP NOTE
TENDON REPAIR EXTENSOR FOREARM / WRIST  Progress Note    Sami Lambert  4/15/2024    Pre-op Diagnosis:   M77.11       Post-Op Diagnosis Codes:  SAME    Procedure/CPT® Codes:  Procedure(s):  RIGHT ELBOW COMMON EXTENSOR ORIGIN REPAIR    Surgeon(s):  Cornelius Arndt MD    Anesthesia: Choice    Staff:   Circulator: Sophie Rodriguez RN; Christopher Martinez RN  Scrub Person: Tone Lipscomb; Cam Burgos  Vendor Representative: Remi Henderson; Jesu Slade     Estimated Blood Loss:  < 10 mL    Urine Voided: * No values recorded between 4/15/2024 10:09 AM and 4/15/2024 11:16 AM *    Specimens:                None      Drains: * No LDAs found *    Findings: Completely torn and partially retracted ECRB and common extensor origin, right lateral elbow    Complications: None    Cornelius Arndt MD     Date: 4/15/2024  Time: 11:23 CDT

## 2024-04-15 NOTE — ANESTHESIA PROCEDURE NOTES
Airway  Urgency: elective    Date/Time: 4/15/2024 10:16 AM  Airway not difficult    General Information and Staff    Patient location during procedure: OR  CRNA/CAA: Malachi Patterson CRNA    Indications and Patient Condition  Indications for airway management: airway protection    Preoxygenated: yes  Mask difficulty assessment: 1 - vent by mask    Final Airway Details  Final airway type: supraglottic airway      Successful airway: Supreme  Size 4     Number of attempts at approach: 1  Assessment: lips, teeth, and gum same as pre-op and atraumatic intubation

## 2024-04-15 NOTE — H&P
Pt Name: Sami Lambert  MRN: [id number]  YOB: 1970  Date: 4/15/2024      HPI: Patient is a 53 y.o. year old male who presented to the clinic with a long standing history of right lateral elbow, forearm and wrist weakness and pain. Conservative treatments of night splints, rest, and NSAIDs have been ineffective in alleviating the symptoms.  He has an MRI demonstrating tearing of the common extensor origin as well as the extensor carpi radialis brevis over the lateral epicondyle of the right elbow consistent with lateral epicondylitis/tennis elbow.    Past Medical/Surgical History:   Past Medical History:   Diagnosis Date    Arthritis     Basal cell carcinoma     Headache     Hyperlipidemia     PTSD (post-traumatic stress disorder)     Sciatica     Sleep apnea      Past Surgical History:   Procedure Laterality Date    COLONOSCOPY      EXTENSOR TENDON OF FOREARM / WRIST REPAIR Left 12/18/2023    Procedure: LEFT LATERAL ELBOW COMMON EXTENSOR TENDON REPAIR;  Surgeon: Cornelius Arndt MD;  Location: [facility];  Service: Orthopedics;  Laterality: Left;    SKIN TAG REMOVAL      WISDOM TOOTH EXTRACTION       Social History:   Smoking:  none  Alcohol: occasional/infrequent    Medications:   No current facility-administered medications for this encounter.       Allergies:   Allergies   Allergen Reactions    Sulfa Antibiotics Rash       Review of systems:     * Constitutional: negative     * HEENT: negative     * Skin: negative     * Cardiac: negative     * Respiratory: negative     * GI: negative     * : negative     * Neuro: negative     * CNS: negative     * Extremities: negative     * Endcrine: negative     Physical Exam:   There were no vitals taken for this visit.    - General: normal development and appearance     - HEENT: normocephalic, ERIN     - Skin: pink, warm, normal tone     - Neck: supple, no masses,     - Chest: no rales or wheezes, normal expansion     - Heart: RRR, no murmurs/gallops     - Abdomen:  soft, nondistended, nontender, normal sounds     - Vascular: normal pulses, color, tone     - Pysch/Neuro: normal affect, mood, alert and oriented     - Musculoskeletal: Exam of patients right elbow shows the skin is clean, dry, and intact. No deformities are seen. Decreased wrist extension strength and resisted supination due to pain.  Sensation in the median, radial and ulnar nerve distributions are intact. Radial pulse was strong and capillary refill was less than two seconds. Full range of motion of the wrist and fingers.  No posterolateral rotatory instability noted.    Impression: Lateral epicondylitis with common extensor tearing of the right lateral elbow.      Surgical Plan: Open common extensor origin repair with lateral epicondylectomy of the right elbow.      Electronically signed by Cornelius Arndt MD on 4/15/2024 at 07:43 CDT

## 2024-04-15 NOTE — ANESTHESIA POSTPROCEDURE EVALUATION
Patient: Sami Lambert    Procedure Summary       Date: 04/15/24 Room / Location: [facility] OR  /  [facility] OR    Anesthesia Start: 1009 Anesthesia Stop: 1120    Procedure: RIGHT ELBOW COMMON EXTENSOR ORIGIN REPAIR (Right: Elbow) Diagnosis: (M77.11)    Surgeons: Cornelius Arndt MD Provider: Malachi Patterson CRNA    Anesthesia Type: general with block ASA Status: 2            Anesthesia Type: general with block    Vitals  Vitals Value Taken Time   /89 04/15/24 1200   Temp 97.5 °F (36.4 °C) 04/15/24 1155   Pulse 66 04/15/24 1200   Resp 16 04/15/24 1200   SpO2 100 % 04/15/24 1200           Post Anesthesia Care and Evaluation    Patient location during evaluation: PACU  Patient participation: complete - patient participated  Level of consciousness: awake and alert  Pain management: adequate    Airway patency: patent  Anesthetic complications: No anesthetic complications    Cardiovascular status: acceptable  Respiratory status: acceptable  Hydration status: acceptable    Comments: Blood pressure 138/89, pulse 66, temperature 97.5 °F (36.4 °C), resp. rate 16, SpO2 100%.    Pt discharged from PACU based on dusty score >8

## 2024-04-15 NOTE — ANESTHESIA PREPROCEDURE EVALUATION
Anesthesia Evaluation     no history of anesthetic complications:   NPO Solid Status: > 8 hours  NPO Liquid Status: > 8 hours           Airway   Mallampati: II  No difficulty expected  Dental      Pulmonary    (+) ,sleep apnea on CPAP  (-) not a smoker  Cardiovascular   Exercise tolerance: good (4-7 METS)    (+) hyperlipidemia  (-) hypertension, CAD      Neuro/Psych  (+) headaches, psychiatric history PTSD  (-) seizures, TIA, CVA  GI/Hepatic/Renal/Endo    (-) liver disease, no renal disease, diabetes    Musculoskeletal     Abdominal    Substance History      OB/GYN          Other                        Anesthesia Plan    ASA 2     general with block     intravenous induction     Anesthetic plan, risks, benefits, and alternatives have been provided, discussed and informed consent has been obtained with: patient.      CODE STATUS:

## 2024-04-15 NOTE — OP NOTE
OPERATIVE NOTE  Operative Report    Pt Name: Sami Lambert  MRN: [id number]  YOB: 1970  Date: 4/15/2024      PREOPERATIVE DIAGNOSIS: Right elbow chronic lateral epicondylitis.     POSTOPERATIVE DIAGNOSIS:  Right elbow chronic lateral epicondylitis.     PROCEDURE:  Procedure(s):  RIGHT ELBOW COMMON EXTENSOR ORIGIN REPAIR     SURGEON:  Cornelius Arndt MD    ASSISTANT:  None    ANESTHESIA:  Choice    ESTIMATED BLOOD LOSS:  < 10 mL    COMPLICATIONS:  None.    CONDITION:  Stable.    IMPLANTS:    Implant Name Type Inv. Item Serial No.  Lot No. LRB No. Used Action   DX Knotless FiberTak Instability Implant Kit    [employer] [id number] Right 1 Implanted       IMPLANTS: Arthrex 1.8 mm knotless FiberTak anchor       ANESTHESIA USED: General endotracheal anesthesia.         OPERATIVE INDICATIONS  The patient is a 53 y.o. male who has been seen in my clinic on multiple occasions in regards to right lateral elbow pain consistent with lateral epicondylitis.  Multiple attempts at conservative treatment in the form of injections and physical therapy were made however the patient continued to have pain, weakness, and loss of function.  The patient wished to proceed with surgery understanding risks, benefits, and alternatives.  Risks included but were not limited to that of anesthesia, bleeding, infection, pain, damage to local structures (lateral antebrachial cutaneous nerve), need for further surgery.  We discussed failure to improve symptoms as well as the loss of function.       SPECIMENS: None.       DRAINS: None.       PROCEDURE IN DETAIL    The patient was seen in the preoperative holding room.  Once again, the informed consent form was reviewed with the patient and signed.  The site of surgery was marked with the patient's agreement.  The patient was transported to the operating room where a time out was performed identifying the correct patient as well as the operative site.  IV Kefzol 2 g was given as  perioperative antibiotics. The operative right upper extremity was prepped and draped in usual sterile fashion.   Tourniquet was placed about the right brachium inflated to 200 mmHg and total tourniquet time was less than 30 minutes.         An incision was made on the lateral aspect of the elbow.  Soft tissue was dissected to the level of the common extensor tendon as well as the lateral epicondyle identifying the ECRL anteriorly via its muscular belly. A midline radiocapitellar incision was made through the substance of the EDC identifying the underlying ECRB.  The degenerated tendon was then excised.  A rongeur was used to remove the prominence of the anterolateral aspect of the lateral epicondyle.  A repair of the common extensor tendon origin was then performed by initially placing an Arthrex 1.8 mm knotless FiberTak anchor in the prepared bonebed.  Using a free needle, the repair suture was then passed through the ECRL and ECRB in an inverted figure-of-eight fashion.  Once the repair has been appropriately tensioned, the repair suture is retrieved back through the anchor using the shuttling suture completing the repair.     The incision was irrigated followed by closure in layers.  The skin was closed with adhesive glue.  Sterile dressing was placed.  The patient was awakened from anesthesia after being placed to a well-padded long-arm splint.  The patient was transported to the recovery room in stable condition.       POSTOPERATIVE PLAN:   Discharge home with family.  Follow up in 2 weeks for a clinical check and beginning range of motion.

## 2024-04-15 NOTE — DISCHARGE INSTRUCTIONS
Upper Extremity Post-op Instructions  Dr. AVILA      POST-OP CARE: Please follow these instructions closely!    Sling use: The sling is provided for your comfort and to ensure proper healing of your repair following surgery. Please  place the abduction pillow under your arm. Your surgery requires that you wear the sling:  __X__ For comfort.  No sling required    IMPORTANT PHONE NUMBERS:  For emergencies, please call 911  You may reach Dr. Avila or his MA Renata Thapa at [phone] EXT: [phone]  M-F 8:00am-5:00pm  After 5pm or on the weekends, please call [phone] to reach the doctor on call.  Call immediately if you have any of the following symptoms:  Elevated temperature above 101 degrees for more than 48hours after surgery  Persistent drainage from wound  Severe pain around surgical site  Calf pain  Any signs of infection    Dressings: Keep dressing/splint intact unless instructed otherwise below. SOME DRAINAGE IS NORMAL!     DO NOT touch or apply ointment to the incision.     DO NOT remove the steri-strips over the incisions (if you have steri-strips). They will         generally fall off on their own or can be removed 2 weeks after surgery.     If you have yellow gauze and it comes off, do not worry about it. Leave them off.    Signs of infection that warrant a phone call to our clinical line:     o Excessive drainage or redness     o Red streaking coming away from the incision  o Increased pain  o Increased temperature above 101 degrees    Sutures:  If your physician uses sutures in your knee, they will dissolve on their own and will not need to be removed.  Some black sutures occasionally used will need to be removed 10-14 days after surgery.    Bathing:    _X_Keep your splint/dressing clean, dry, intact.  Do not place foreign objects inside the splint/dressing.    Physical Therapy: Your physical therapy status will be discussed with you postoperatively and at your first post-op appointment. Some injuries  will not require physical therapy.    Medications: You will be discharged with the appropriate medications following your surgery. Fill these at the pharmacy and take them as directed on the label. Not all of the medications below may be prescribed. Occasionally, other medications may be prescribed with specific instructions.    Percocet/Lortab (oxycodone/hydrocodone with tylenol) - Pain Medication, will cause drowsiness     o Take 1-2 tablets every 4-6 hours. DO NOT EXCEED 4,000mg of Tylenol in 24 hours.  **DO NOT MIX WITH ALCOHOL, DRIVE WHILE TAKING, OR TAKE with extra TYLENOL**    Colace (Docusate) - stool softener, used for constipation. Take this only if you feel constipated.    Zofran (Ondansetron) or Phenergan - Anti-nausea medication, will cause drowsiness    Aspirin 81 mg - all patients with upper extremity surgery should take one aspirin 81 mg tablet every 12 hours (twice daily) for 21 days after surgery    **If you are running low on pain medications, please notify us if you need a refill 24-48 hours prior to when you run out, so we can make arrangements to refill the prescription for you if we determine it is necessary**    What to expect after a Nerve Block  Nerve blocks administered to block pain affect many types of nerves, including those nerves that control movement, pain, and normal sensation.  Following a nerve block, you may notice some bruising at the site where the block was given.  You may experience sensations such as:  numbness of the affected area or limb, tingling, heaviness (that is the limb feels heavy to you), weakness or inability to move the affected arm or leg, or a feeling as if your arm or leg has “fallen asleep”.    A nerve block can last from 9-18 hours depending on the medications used.  Certain medications can last up to 72 hours, your anesthesiologist may have discussed this with you pre-op. Usually the weakness wears off first followed by the tingling and heaviness.  As the  block wears off, you may begin to notice pain; however, this sequence of events may occur in any order.  Typically, you will be able to move your limb before you will feel it.  Once a nerve block begins to wear off, the effects are usually completely gone within 60 minutes.    If you experience continued side effects that you believe are block related, please call your healthcare provider.  Please see block-specific instructions below.      Instructions for any block involving the shoulder or arm  If you have had any kind of shoulder/arm block, you will go home with your arm in a sling.  Wear the sling until the block has completely worn off or as directed by your doctor.  You may be required to wear it for a longer period of time per your surgeon’s recommendations.  I you have had a shoulder/arm block; it is a good idea to sleep on a recliner with pillows under your arm.    Note:  If you have severe or prolonged shortness of breath, please seek medical assistance as soon as possible.    Protection of a “blocked” arm (limb)  After a nerve block, you cannot feel pain, pressure, or extremes of temperature in the affected limb.  And because of this, your blocked limb is at more risk for injury.  For example, it is possible to burn your limb on an extremely hot surface without feeling it.  When resting, it is important to reposition your limb periodically to avoid prolonged pressure on it.  This may require the use of pillows and padding.  While sleeping, you should avoid rolling onto the affected limb or putting too much pressure on it.  If you have a cast or tight dressing, check the color of your fingers of the affected limb.  Call your surgeon if they look discolored (that is, dusky, dark colored)  Use caution in cold weather.  Cover your limb appropriately to protect it from the cold.  Pain Management  Your surgeon will give you a prescription for pain medication.  Begin taking this before the nerve block wears  off.  Bear in mind that sometimes the block can wear off in the middle of the night.

## 2024-06-26 PROBLEM — R11.0 NAUSEA: Status: ACTIVE | Noted: 2024-06-26

## 2024-06-26 PROBLEM — R42 VERTIGO: Status: ACTIVE | Noted: 2024-06-26

## 2024-06-27 ENCOUNTER — PATIENT ROUNDING (BHMG ONLY) (OUTPATIENT)
Dept: URGENT CARE | Facility: CLINIC | Age: 54
End: 2024-06-27
Payer: OTHER GOVERNMENT

## 2024-06-27 NOTE — ED NOTES
Thank you for letting us care for you in your recent visit to our urgent care center. We would love to hear about your experience with us. Was this the first time you have visited our location?    We’re always looking for ways to make our patients’ experiences even better. Do you have any recommendations on ways we may improve?     I appreciate you taking the time to respond. Please be on the lookout for a survey about your recent visit from [facility] via text or email. We would greatly appreciate if you could fill that out and turn it back in. We want your voice to be heard and we value your feedback.   Thank you for choosing [facility] for your healthcare needs.

## (undated) DEVICE — SYS CLS SKIN PREMIERPRO EXOFINFUSION 22CM

## (undated) DEVICE — LP VESL MINI BLU PK/2

## (undated) DEVICE — PAD UNDERCAST WYTEX 4IN 4YD LF STRL

## (undated) DEVICE — BNDG ESMARK 4IN 9FT LF STRL BLU

## (undated) DEVICE — KT INST FOR FIBERTAKDX SUT/ANCH W/GW 1.6MM 1.8MM DISP

## (undated) DEVICE — DISPOSABLE TOURNIQUET CUFF 24"X4", 1-LINE, YELLOW, STERILE, 1EA/PK, 10PK/CS: Brand: ASP MEDICAL

## (undated) DEVICE — ANTIBACTERIAL UNDYED BRAIDED (POLYGLACTIN 910), SYNTHETIC ABSORBABLE SUTURE: Brand: COATED VICRYL

## (undated) DEVICE — ENDO KIT,LOURDES HOSPITAL: Brand: MEDLINE INDUSTRIES, INC.

## (undated) DEVICE — BNDG ELAS ECON W/CLIP 4IN 5YD LF STRL

## (undated) DEVICE — CVR BRD ARM 13X30

## (undated) DEVICE — GLV SURG DERMASSURE GRN LF PF 8.5

## (undated) DEVICE — PK EXTRM 30

## (undated) DEVICE — SHORT LENS-STERILE

## (undated) DEVICE — 4-PORT MANIFOLD: Brand: NEPTUNE 2

## (undated) DEVICE — GLV SURG SENSICARE PI ORTHO SZ8.5 LF STRL

## (undated) DEVICE — TRAP FLD MINIVAC MEGADYNE 100ML

## (undated) DEVICE — CVR UNIV C/ARM